# Patient Record
Sex: FEMALE | Race: WHITE | Employment: UNEMPLOYED | URBAN - METROPOLITAN AREA
[De-identification: names, ages, dates, MRNs, and addresses within clinical notes are randomized per-mention and may not be internally consistent; named-entity substitution may affect disease eponyms.]

---

## 2017-05-27 ENCOUNTER — TRANSFERRED RECORDS (OUTPATIENT)
Dept: HEALTH INFORMATION MANAGEMENT | Facility: CLINIC | Age: 22
End: 2017-05-27

## 2017-06-16 ENCOUNTER — TELEPHONE (OUTPATIENT)
Dept: OBGYN | Facility: CLINIC | Age: 22
End: 2017-06-16

## 2017-06-16 NOTE — TELEPHONE ENCOUNTER
Patient is needing her Implanon to be removed and wants another to be replaced. Please call to schedule. Thank you.

## 2017-06-19 ENCOUNTER — OFFICE VISIT (OUTPATIENT)
Dept: FAMILY MEDICINE | Facility: CLINIC | Age: 22
End: 2017-06-19
Payer: COMMERCIAL

## 2017-06-19 VITALS
HEART RATE: 85 BPM | WEIGHT: 211 LBS | HEIGHT: 64 IN | BODY MASS INDEX: 36.02 KG/M2 | TEMPERATURE: 98.5 F | DIASTOLIC BLOOD PRESSURE: 69 MMHG | SYSTOLIC BLOOD PRESSURE: 129 MMHG

## 2017-06-19 DIAGNOSIS — R10.84 ABDOMINAL PAIN, GENERALIZED: ICD-10-CM

## 2017-06-19 DIAGNOSIS — R19.7 DIARRHEA, UNSPECIFIED TYPE: ICD-10-CM

## 2017-06-19 DIAGNOSIS — N91.2 ABSENCE OF MENSTRUATION: Primary | ICD-10-CM

## 2017-06-19 LAB
ALBUMIN SERPL-MCNC: 4.1 G/DL (ref 3.4–5)
ALBUMIN UR-MCNC: NEGATIVE MG/DL
ALP SERPL-CCNC: 74 U/L (ref 40–150)
ALT SERPL W P-5'-P-CCNC: 29 U/L (ref 0–50)
ANION GAP SERPL CALCULATED.3IONS-SCNC: 7 MMOL/L (ref 3–14)
APPEARANCE UR: CLEAR
AST SERPL W P-5'-P-CCNC: 14 U/L (ref 0–45)
BASOPHILS # BLD AUTO: 0 10E9/L (ref 0–0.2)
BASOPHILS NFR BLD AUTO: 0.2 %
BETA HCG QUAL IFA URINE: NEGATIVE
BILIRUB SERPL-MCNC: 0.2 MG/DL (ref 0.2–1.3)
BILIRUB UR QL STRIP: NEGATIVE
BUN SERPL-MCNC: 11 MG/DL (ref 7–30)
CALCIUM SERPL-MCNC: 9.4 MG/DL (ref 8.5–10.1)
CHLORIDE SERPL-SCNC: 106 MMOL/L (ref 94–109)
CO2 SERPL-SCNC: 28 MMOL/L (ref 20–32)
COLOR UR AUTO: YELLOW
CREAT SERPL-MCNC: 0.88 MG/DL (ref 0.52–1.04)
DIFFERENTIAL METHOD BLD: NORMAL
EOSINOPHIL # BLD AUTO: 0.1 10E9/L (ref 0–0.7)
EOSINOPHIL NFR BLD AUTO: 1.5 %
ERYTHROCYTE [DISTWIDTH] IN BLOOD BY AUTOMATED COUNT: 12.7 % (ref 10–15)
GFR SERPL CREATININE-BSD FRML MDRD: 82 ML/MIN/1.7M2
GLUCOSE SERPL-MCNC: 74 MG/DL (ref 70–99)
GLUCOSE UR STRIP-MCNC: NEGATIVE MG/DL
HCT VFR BLD AUTO: 39.5 % (ref 35–47)
HGB BLD-MCNC: 13.5 G/DL (ref 11.7–15.7)
HGB UR QL STRIP: NEGATIVE
KETONES UR STRIP-MCNC: NEGATIVE MG/DL
LEUKOCYTE ESTERASE UR QL STRIP: NEGATIVE
LYMPHOCYTES # BLD AUTO: 2.4 10E9/L (ref 0.8–5.3)
LYMPHOCYTES NFR BLD AUTO: 41.6 %
MCH RBC QN AUTO: 31 PG (ref 26.5–33)
MCHC RBC AUTO-ENTMCNC: 34.2 G/DL (ref 31.5–36.5)
MCV RBC AUTO: 91 FL (ref 78–100)
MONOCYTES # BLD AUTO: 0.5 10E9/L (ref 0–1.3)
MONOCYTES NFR BLD AUTO: 8.1 %
NEUTROPHILS # BLD AUTO: 2.8 10E9/L (ref 1.6–8.3)
NEUTROPHILS NFR BLD AUTO: 48.6 %
NITRATE UR QL: NEGATIVE
PH UR STRIP: 6 PH (ref 5–7)
PLATELET # BLD AUTO: 171 10E9/L (ref 150–450)
POTASSIUM SERPL-SCNC: 3.9 MMOL/L (ref 3.4–5.3)
PROT SERPL-MCNC: 7.4 G/DL (ref 6.8–8.8)
RBC # BLD AUTO: 4.35 10E12/L (ref 3.8–5.2)
SODIUM SERPL-SCNC: 141 MMOL/L (ref 133–144)
SP GR UR STRIP: 1.02 (ref 1–1.03)
URN SPEC COLLECT METH UR: NORMAL
UROBILINOGEN UR STRIP-ACNC: 0.2 EU/DL (ref 0.2–1)
WBC # BLD AUTO: 5.8 10E9/L (ref 4–11)

## 2017-06-19 PROCEDURE — 85025 COMPLETE CBC W/AUTO DIFF WBC: CPT | Performed by: FAMILY MEDICINE

## 2017-06-19 PROCEDURE — 80053 COMPREHEN METABOLIC PANEL: CPT | Performed by: FAMILY MEDICINE

## 2017-06-19 PROCEDURE — 99214 OFFICE O/P EST MOD 30 MIN: CPT | Performed by: FAMILY MEDICINE

## 2017-06-19 PROCEDURE — 36415 COLL VENOUS BLD VENIPUNCTURE: CPT | Performed by: FAMILY MEDICINE

## 2017-06-19 PROCEDURE — 84703 CHORIONIC GONADOTROPIN ASSAY: CPT | Performed by: FAMILY MEDICINE

## 2017-06-19 PROCEDURE — 81003 URINALYSIS AUTO W/O SCOPE: CPT | Performed by: FAMILY MEDICINE

## 2017-06-19 NOTE — NURSING NOTE
"Chief Complaint   Patient presents with     Vomiting     would like pregnancy test       Initial /69  Pulse 85  Temp 98.5  F (36.9  C) (Oral)  Ht 5' 4\" (1.626 m)  Wt 211 lb (95.7 kg)  BMI 36.22 kg/m2 Estimated body mass index is 36.22 kg/(m^2) as calculated from the following:    Height as of this encounter: 5' 4\" (1.626 m).    Weight as of this encounter: 211 lb (95.7 kg).  Medication Reconciliation: complete     Madelyn Carter MA      "

## 2017-06-19 NOTE — MR AVS SNAPSHOT
After Visit Summary   6/19/2017    Clare Hummel    MRN: [id number]           Patient Information     Date Of Birth          1995        Visit Information        Provider Department      6/19/2017 2:00 PM Janie Bangura MD [facility]        Today's Diagnoses     Absence of menstruation    -  1    Diarrhea, unspecified type        Abdominal pain, generalized           Follow-ups after your visit        Your next 10 appointments already scheduled     Jul 11, 2017  6:20 PM CDT   PROCEDURE with Kevin Brewster MD   [facility] ([facility]    [address] [facility] [address] [phone]              Future tests that were ordered for you today     Open Future Orders        Priority Expected Expires Ordered    Enteric Bacteria and Virus Panel by JOHAN Stool Routine  6/19/2018 6/19/2017    Giardia antigen Routine  6/19/2018 6/19/2017    Clostridium difficile Toxin B PCR Routine  6/19/2018 6/19/2017            Who to contact     If you have questions or need follow up information about today's clinic visit or your schedule please contact [facility] directly at [phone].  Normal or non-critical lab and imaging results will be communicated to you by MyChart, letter or phone within 4 business days after the clinic has received the results. If you do not hear from us within 7 days, please contact the clinic through Zayohart or phone. If you have a critical or abnormal lab result, we will notify you by phone as soon as possible.  Submit refill requests through [employer] or call your pharmacy and they will forward the refill request to us. Please allow 3 business days for your refill to be completed.          Additional Information About Your Visit        MyChart Information     [employer] lets you send messages to your doctor, view your test results, renew your prescriptions, schedule appointments and more. To sign up, go to  [email or web address] . Click on \"Log in\" on the left side of the screen, which will take you to the Welcome page. Then click on \"Sign up Now\" on the right side of the page.     You will be asked to enter the access code listed below, as well as some personal information. Please follow the directions to create your username and password.     Your access code is: [email or web address]  Expires: 2017  5:26 PM     Your access code will  in 90 days. If you need help or a new code, please call your [place] clinic or [phone].        Care EveryWhere ID     This is your Care EveryWhere ID. This could be used by other organizations to access your [place] medical records  [id number]        Your Vitals Were     Pulse Temperature Height BMI (Body Mass Index)          85 98.5  F (36.9  C) (Oral) 5' 4\" (1.626 m) 36.22 kg/m2         Blood Pressure from Last 3 Encounters:   17 129/69   16 119/75   16 120/74    Weight from Last 3 Encounters:   17 211 lb (95.7 kg)   16 212 lb (96.2 kg) (98 %)*   16 214 lb 14.4 oz (97.5 kg) (98 %)*     * Growth percentiles are based on CDC 2-20 Years data.              We Performed the Following     *UA reflex to Microscopic and Culture ([place] and [facility] (except [place] and [place])     Beta HCG qual IFA urine     CBC with platelets and differential     Comprehensive metabolic panel        Primary Care Provider Office Phone # Fax #    Laina Stratton [phone]       [facility]  [address] [facility] [address]        Thank you!     Thank you for choosing [facility]  [facility]. Our goal is always to provide you with excellent care. Hearing back from our patients is one way we can continue to improve our services. Please take a few minutes to complete the written survey that you may receive in the mail after your visit with us. Thank you!             Your Updated Medication List - " Protect others around you: Learn how to safely use, store and throw away your medicines at www.disposemymeds.org.          This list is accurate as of: 6/19/17  9:04 PM.  Always use your most recent med list.                   Brand Name Dispense Instructions for use    etonogestrel 68 MG Impl    IMPLANON/NEXPLANON     1 each by Subdermal route once

## 2017-06-19 NOTE — PROGRESS NOTES
"  SUBJECTIVE:                                                    Clare Hummel is a 21 year old female who presents to clinic today for the following health issues:        Vomited today at work. Not feeling well.   Birthcontrol implant is  by 4 months.   Had slight spotting a few days ago. Diarrhea and body aches    Would like pregnancy test.     Pt with 10 days of diarrhea and then vomiting today. No fever. Stools are not black or bloody  Pt gets crampy with diarrhea.  Stooling about 3 times per day which is her norm but they are a lot more loose than normal.   No weight loss \nobody else with similar symptoms. No recent travel,unusual food.         Problem list and histories reviewed & adjusted, as indicated.  Additional history: as documented    Labs reviewed in EPIC    Reviewed and updated as needed this visit by clinical staff  Tobacco  Allergies  Meds  Med Hx  Surg Hx  Fam Hx  Soc Hx      Reviewed and updated as needed this visit by Provider         ROS:  Constitutional, HEENT, cardiovascular, pulmonary, gi and gu systems are negative, except as otherwise noted.    OBJECTIVE:                                                    /69  Pulse 85  Temp 98.5  F (36.9  C) (Oral)  Ht 5' 4\" (1.626 m)  Wt 211 lb (95.7 kg)  BMI 36.22 kg/m2  Body mass index is 36.22 kg/(m^2).  GENERAL: healthy, alert and no distress  RESP: lungs clear to auscultation - no rales, rhonchi or wheezes  CV: regular rate and rhythm, normal S1 S2, no S3 or S4, no murmur, click or rub, no peripheral edema and peripheral pulses strong  ABDOMEN: soft, diffusely mildly tender, no hepatosplenomegaly, no masses and bowel sounds normal    Diagnostic Test Results:  UPTnegative  Lab Results   Component Value Date    WBC 5.8 2017     Lab Results   Component Value Date    RBC 4.35 2017     Lab Results   Component Value Date    HGB 13.5 2017     Lab Results   Component Value Date    HCT 39.5 2017     No " components found for: MCT  Lab Results   Component Value Date    MCV 91 06/19/2017     Lab Results   Component Value Date    MCH 31.0 06/19/2017     Lab Results   Component Value Date    MCHC 34.2 06/19/2017     Lab Results   Component Value Date    RDW 12.7 06/19/2017     Lab Results   Component Value Date     06/19/2017          ASSESSMENT/PLAN:                                                            1. Absence of menstruation  Negative pregnancy test. Have her set up for an appointment with Dr caceres for Nexplanon replacement  - Beta HCG qual IFA urine    2. Diarrhea, unspecified type  Await stool cultures  - Enteric Bacteria and Virus Panel by JOHAN Stool; Future  - Giardia antigen; Future  - Clostridium difficile Toxin B PCR; Future    3. Abdominal pain, generalized  Await results  - *UA reflex to Microscopic and Culture ([place] and [facility] (except [place] and [place])  - CBC with platelets and differential  - Comprehensive metabolic panel  - Enteric Bacteria and Virus Panel by JOHAN Stool; Future        Janie Martinez MD  [facility]

## 2017-06-19 NOTE — LETTER
[facility]  [address] [facility] [address] [phone]        June 20, 2017    Clare Hummel  [address] [facility] [address]          Dear Clare,    Your electrolytes, liver and kidney function are normal. Your urine shows no signs of infection   Your hemoglobin and WBC are normal.   Please submit your stool samples if you have not already done so.     Janie Martinez MD/judy      Results for orders placed or performed in visit on 06/19/17   Beta HCG qual IFA urine   Result Value Ref Range    Beta HCG Qual IFA Urine Negative NEG   *UA reflex to Microscopic and Culture ([place] and [facility] (except [place] and [place])   Result Value Ref Range    Color Urine Yellow     Appearance Urine Clear     Glucose Urine Negative NEG mg/dL    Bilirubin Urine Negative NEG    Ketones Urine Negative NEG mg/dL    Specific Gravity Urine 1.025 1.003 - 1.035    Blood Urine Negative NEG    pH Urine 6.0 5.0 - 7.0 pH    Protein Albumin Urine Negative NEG mg/dL    Urobilinogen Urine 0.2 0.2 - 1.0 EU/dL    Nitrite Urine Negative NEG    Leukocyte Esterase Urine Negative NEG    Source Midstream Urine    CBC with platelets and differential   Result Value Ref Range    WBC 5.8 4.0 - 11.0 10e9/L    RBC Count 4.35 3.8 - 5.2 10e12/L    Hemoglobin 13.5 11.7 - 15.7 g/dL    Hematocrit 39.5 35.0 - 47.0 %    MCV 91 78 - 100 fl    MCH 31.0 26.5 - 33.0 pg    MCHC 34.2 31.5 - 36.5 g/dL    RDW 12.7 10.0 - 15.0 %    Platelet Count 171 150 - 450 10e9/L    Diff Method Automated Method     % Neutrophils 48.6 %    % Lymphocytes 41.6 %    % Monocytes 8.1 %    % Eosinophils 1.5 %    % Basophils 0.2 %    Absolute Neutrophil 2.8 1.6 - 8.3 10e9/L    Absolute Lymphocytes 2.4 0.8 - 5.3 10e9/L    Absolute Monocytes 0.5 0.0 - 1.3 10e9/L    Absolute Eosinophils 0.1 0.0 - 0.7 10e9/L    Absolute Basophils 0.0 0.0 - 0.2 10e9/L   Comprehensive metabolic panel   Result Value Ref Range    Sodium 141 133 - 144 mmol/L    Potassium  3.9 3.4 - 5.3 mmol/L    Chloride 106 94 - 109 mmol/L    Carbon Dioxide 28 20 - 32 mmol/L    Anion Gap 7 3 - 14 mmol/L    Glucose 74 70 - 99 mg/dL    Urea Nitrogen 11 7 - 30 mg/dL    Creatinine 0.88 0.52 - 1.04 mg/dL    GFR Estimate 82 >60 mL/min/1.7m2    GFR Estimate If Black >90   GFR Calc   >60 mL/min/1.7m2    Calcium 9.4 8.5 - 10.1 mg/dL    Bilirubin Total 0.2 0.2 - 1.3 mg/dL    Albumin 4.1 3.4 - 5.0 g/dL    Protein Total 7.4 6.8 - 8.8 g/dL    Alkaline Phosphatase 74 40 - 150 U/L    ALT 29 0 - 50 U/L    AST 14 0 - 45 U/L

## 2017-06-22 NOTE — TELEPHONE ENCOUNTER
Patient had an appointment 6/19/17 with Janie Bangura. She is set up for removal and replacement on 7/11/17 with Dr. Brewster.    Hugo Perdue [facility]

## 2017-06-22 NOTE — TELEPHONE ENCOUNTER
I tried reaching patient Just rings and states no voicemail set up yet.  Will try again later.  ROSA Colindres 6/22/2017

## 2017-07-11 ENCOUNTER — OFFICE VISIT (OUTPATIENT)
Dept: FAMILY MEDICINE | Facility: CLINIC | Age: 22
End: 2017-07-11
Payer: COMMERCIAL

## 2017-07-11 ENCOUNTER — DOCUMENTATION ONLY (OUTPATIENT)
Dept: LAB | Facility: CLINIC | Age: 22
End: 2017-07-11

## 2017-07-11 VITALS
WEIGHT: 215 LBS | HEART RATE: 94 BPM | OXYGEN SATURATION: 98 % | TEMPERATURE: 98.5 F | SYSTOLIC BLOOD PRESSURE: 135 MMHG | DIASTOLIC BLOOD PRESSURE: 85 MMHG | BODY MASS INDEX: 36.9 KG/M2

## 2017-07-11 DIAGNOSIS — Z30.09 COUNSELING FOR BIRTH CONTROL, ORAL CONTRACEPTIVES: ICD-10-CM

## 2017-07-11 DIAGNOSIS — Z30.46 NEXPLANON REMOVAL: Primary | ICD-10-CM

## 2017-07-11 LAB — BETA HCG QUAL IFA URINE: NEGATIVE

## 2017-07-11 PROCEDURE — 84703 CHORIONIC GONADOTROPIN ASSAY: CPT | Performed by: FAMILY MEDICINE

## 2017-07-11 PROCEDURE — 11976 REMOVE CONTRACEPTIVE CAPSULE: CPT | Performed by: FAMILY MEDICINE

## 2017-07-11 PROCEDURE — 99213 OFFICE O/P EST LOW 20 MIN: CPT | Mod: 25 | Performed by: FAMILY MEDICINE

## 2017-07-11 RX ORDER — NORGESTIMATE AND ETHINYL ESTRADIOL 7DAYSX3 28
1 KIT ORAL DAILY
Qty: 84 TABLET | Refills: 3 | Status: SHIPPED | OUTPATIENT
Start: 2017-07-11 | End: 2017-09-14

## 2017-07-11 ASSESSMENT — ANXIETY QUESTIONNAIRES
2. NOT BEING ABLE TO STOP OR CONTROL WORRYING: NOT AT ALL
5. BEING SO RESTLESS THAT IT IS HARD TO SIT STILL: NOT AT ALL
IF YOU CHECKED OFF ANY PROBLEMS ON THIS QUESTIONNAIRE, HOW DIFFICULT HAVE THESE PROBLEMS MADE IT FOR YOU TO DO YOUR WORK, TAKE CARE OF THINGS AT HOME, OR GET ALONG WITH OTHER PEOPLE: SOMEWHAT DIFFICULT
1. FEELING NERVOUS, ANXIOUS, OR ON EDGE: NEARLY EVERY DAY
3. WORRYING TOO MUCH ABOUT DIFFERENT THINGS: NOT AT ALL
7. FEELING AFRAID AS IF SOMETHING AWFUL MIGHT HAPPEN: NOT AT ALL
6. BECOMING EASILY ANNOYED OR IRRITABLE: NEARLY EVERY DAY
GAD7 TOTAL SCORE: 9

## 2017-07-11 ASSESSMENT — PATIENT HEALTH QUESTIONNAIRE - PHQ9: 5. POOR APPETITE OR OVEREATING: NEARLY EVERY DAY

## 2017-07-11 NOTE — MR AVS SNAPSHOT
After Visit Summary   7/11/2017    Clare Hummel    MRN: [id number]           Patient Information     Date Of Birth          1995        Visit Information        Provider Department      7/11/2017 6:20 PM Kevin Brewster MD [facility]        Today's Diagnoses     Nexplanon removal    -  1    Counseling for birth control, oral contraceptives          Care Instructions    1. We have removed your Implanon/Nexplanon. You are not protected against pregnancy.    2. You may remove the dressing in 24 hours. You may shower as usual after that.    3. There will be some bruising which will resolve with time.    4. The steri strips will start to peel off in 7-10 days. You may remove them at that time.    5. You have elected to try birth control pills. These are close to 100% if used without missing doses. Slightly increased risk in blood clot.    6. Since you can't rely on your periods, we should do a urine pregnancy test. I will contact you if positive.    7. Come back to see us for a physical in the next 3 months.            Follow-ups after your visit        Who to contact     If you have questions or need follow up information about today's clinic visit or your schedule please contact [facility] directly at [phone].  Normal or non-critical lab and imaging results will be communicated to you by [email or web address], letter or phone within 4 business days after the clinic has received the results. If you do not hear from us within 7 days, please contact the clinic through Soniq[employer] or phone. If you have a critical or abnormal lab result, we will notify you by phone as soon as possible.  Submit refill requests through [employer] or call your pharmacy and they will forward the refill request to us. Please allow 3 business days for your refill to be completed.          Additional Information About Your Visit        eRelyx Information     [employer] lets you send messages to your doctor, view  "your test results, renew your prescriptions, schedule appointments and more. To sign up, go to [email or web address] . Click on \"Log in\" on the left side of the screen, which will take you to the Welcome page. Then click on \"Sign up Now\" on the right side of the page.     You will be asked to enter the access code listed below, as well as some personal information. Please follow the directions to create your username and password.     Your access code is: [email or web address]  Expires: 2017  5:26 PM     Your access code will  in 90 days. If you need help or a new code, please call your [place] clinic or [phone].        Care EveryWhere ID     This is your Care EveryWhere ID. This could be used by other organizations to access your [place] medical records  [id number]        Your Vitals Were     Pulse Temperature Pulse Oximetry BMI (Body Mass Index)          94 98.5  F (36.9  C) (Oral) 98% 36.9 kg/m2         Blood Pressure from Last 3 Encounters:   17 135/85   17 129/69   16 119/75    Weight from Last 3 Encounters:   17 215 lb (97.5 kg)   17 211 lb (95.7 kg)   16 212 lb (96.2 kg) (98 %)*     * Growth percentiles are based on [facility] 2-20 Years data.              We Performed the Following     DEPRESSION ACTION PLAN (DAP)          Today's Medication Changes          These changes are accurate as of: 17  7:00 PM.  If you have any questions, ask your nurse or doctor.               Start taking these medicines.        Dose/Directions    norgestim-eth estrad triphasic 0.18/0.215/0.25 MG-35 MCG per tablet   Commonly known as:  TRINESSA (28)   Used for:  Counseling for birth control, oral contraceptives   Started by:  Kevin Brewster MD        Dose:  1 tablet   Take 1 tablet by mouth daily   Quantity:  84 tablet   Refills:  3         Stop taking these medicines if you haven't already. Please contact your care team if you have questions.     etonogestrel 68 MG Impl   Commonly " known as:  IMPLANON/NEXPLANON   Stopped by:  Kevin Brewster MD                Where to get your medicines      These medications were sent to [id number] - [place], [place] - [address]  [address] [facility] [facility] [place] [address]     Phone:  [phone]     norgestim-eth estrad triphasic 0.18/0.215/0.25 MG-35 MCG per tablet                Primary Care Provider Office Phone # Fax #    Laina Samantha Stratton [phone]       [facility]  [address] [facility] [address]        Equal Access to Services     [facility]: Hadii marilia monreal hadkateyo Soinocencio, waaxda luqadaha, qaybta kaalmada adekimmieyada, jassi ayala. So [facility] [phone].    ATENCIÓN: Si habla español, tiene a cruz disposición servicios gratuitos de asistencia lingüística. [facility] [phone].    We comply with applicable federal civil rights laws and [place] laws. We do not discriminate on the basis of race, color, national origin, age, disability sex, sexual orientation or gender identity.            Thank you!     Thank you for choosing [facility]  [facility]. Our goal is always to provide you with excellent care. Hearing back from our patients is one way we can continue to improve our services. Please take a few minutes to complete the written survey that you may receive in the mail after your visit with us. Thank you!             Your Updated Medication List - Protect others around you: Learn how to safely use, store and throw away your medicines at www.disposemymeds.org.          This list is accurate as of: 7/11/17  7:00 PM.  Always use your most recent med list.                   Brand Name Dispense Instructions for use Diagnosis    norgestim-eth estrad triphasic 0.18/0.215/0.25 MG-35 MCG per tablet    TRINESSA (28)    84 tablet    Take 1 tablet by mouth daily    Counseling for birth control, oral contraceptives

## 2017-07-11 NOTE — NURSING NOTE
"Chief Complaint   Patient presents with     Minor Procedure     Nexplanon removal        Initial /85 (Cuff Size: Adult Regular)  Pulse 94  Temp 98.5  F (36.9  C) (Oral)  Wt 215 lb (97.5 kg)  SpO2 98%  BMI 36.9 kg/m2 Estimated body mass index is 36.9 kg/(m^2) as calculated from the following:    Height as of 6/19/17: 5' 4\" (1.626 m).    Weight as of this encounter: 215 lb (97.5 kg).  Medication Reconciliation: complete    Darcy De Jesus LPN    "

## 2017-07-11 NOTE — PATIENT INSTRUCTIONS
1. We have removed your Implanon/Nexplanon. You are not protected against pregnancy.    2. You may remove the dressing in 24 hours. You may shower as usual after that.    3. There will be some bruising which will resolve with time.    4. The steri strips will start to peel off in 7-10 days. You may remove them at that time.    5. You have elected to try birth control pills. These are close to 100% if used without missing doses. Slightly increased risk in blood clot.    6. Since you can't rely on your periods, we should do a urine pregnancy test. I will contact you if positive.    7. Come back to see us for a physical in the next 3 months.

## 2017-07-11 NOTE — LETTER
My Depression Action Plan  Name: Clare Hummel   Date of Birth 1995  Date: 7/11/2017    My doctor: Laina Stratton   My clinic: [facility]  [address] [place] [address] [phone]          GREEN    ZONE   Good Control    What it looks like:     Things are going generally well. You have normal up s and down s. You may even feel depressed from time to time, but bad moods usually last less than a day.   What you need to do:  1. Continue to care for yourself (see self care plan)  2. Check your depression survival kit and update it as needed  3. Follow your physician s recommendations including any medication.  4. Do not stop taking medication unless you consult with your physician first.           YELLOW         ZONE Getting Worse    What it looks like:     Depression is starting to interfere with your life.     It may be hard to get out of bed; you may be starting to isolate yourself from others.    Symptoms of depression are starting to last most all day and this has happened for several days.     You may have suicidal thoughts but they are not constant.   What you need to do:     1. Call your care team, your response to treatment will improve if you keep your care team informed of your progress. Yellow periods are signs an adjustment may need to be made.     2. Continue your self-care, even if you have to fake it!    3. Talk to someone in your support network    4. Open up your depression survival kit           RED    ZONE Medical Alert - Get Help    What it looks like:     Depression is seriously interfering with your life.     You may experience these or other symptoms: You can t get out of bed most days, can t work or engage in other necessary activities, you have trouble taking care of basic hygiene, or basic responsibilities, thoughts of suicide or death that will not go away, self-injurious behavior.     What you need to do:  1. Call your care team and  request a same-day appointment. If they are not available (weekends or after hours) call your local crisis line, emergency room or 911.          Depression Self Care Plan / Survival Kit    Self-Care for Depression  Here s the deal. Your body and mind are really not as separate as most people think.  What you do and think affects how you feel and how you feel influences what you do and think. This means if you do things that people who feel good do, it will help you feel better.  Sometimes this is all it takes.  There is also a place for medication and therapy depending on how severe your depression is, so be sure to consult with your medical provider and/ or Behavioral Health Consultant if your symptoms are worsening or not improving.     In order to better manage my stress, I will:    Exercise  Get some form of exercise, every day. This will help reduce pain and release endorphins, the  feel good  chemicals in your brain. This is almost as good as taking antidepressants!  This is not the same as joining a gym and then never going! (they count on that by the way ) It can be as simple as just going for a walk or doing some gardening, anything that will get you moving.      Hygiene   Maintain good hygiene (Get out of bed in the morning, Make your bed, Brush your teeth, Take a shower, and Get dressed like you were going to work, even if you are unemployed).  If your clothes don't fit try to get ones that do.    Diet  I will strive to eat foods that are good for me, drink plenty of water, and avoid excessive sugar, caffeine, alcohol, and other mood-altering substances.  Some foods that are helpful in depression are: complex carbohydrates, B vitamins, flaxseed, fish or fish oil, fresh fruits and vegetables.    Psychotherapy  I agree to participate in Individual Therapy (if recommended).    Medication  If prescribed medications, I agree to take them.  Missing doses can result in serious side effects.  I understand that  drinking alcohol, or other illicit drug use, may cause potential side effects.  I will not stop my medication abruptly without first discussing it with my provider.    Staying Connected With Others  I will stay in touch with my friends, family members, and my primary care provider/team.    Use your imagination  Be creative.  We all have a creative side; it doesn t matter if it s oil painting, sand castles, or mud pies! This will also kick up the endorphins.    Witness Beauty  (AKA stop and smell the roses) Take a look outside, even in mid-winter. Notice colors, textures. Watch the squirrels and birds.     Service to others  Be of service to others.  There is always someone else in need.  By helping others we can  get out of ourselves  and remember the really important things.  This also provides opportunities for practicing all the other parts of the program.    Humor  Laugh and be silly!  Adjust your TV habits for less news and crime-drama and more comedy.    Control your stress  Try breathing deep, massage therapy, biofeedback, and meditation. Find time to relax each day.     My support system    Clinic Contact:  Phone number:    Contact 1:  Phone number:    Contact 2:  Phone number:    Religion/:  Phone number:    Therapist:  Phone number:    Local crisis center:    Phone number:    Other community support:  Phone number:

## 2017-07-12 ASSESSMENT — PATIENT HEALTH QUESTIONNAIRE - PHQ9: SUM OF ALL RESPONSES TO PHQ QUESTIONS 1-9: 3

## 2017-07-12 ASSESSMENT — ANXIETY QUESTIONNAIRES: GAD7 TOTAL SCORE: 9

## 2017-07-12 NOTE — PROGRESS NOTES
"Lidocaine 2% with epinephrine-Lot # [id number], Exp 04/2019, NDC# 05890-380-24    HPI:    I am seeing Clare Hummel for the 1st time. She is a 21 year old female here to discuss:    Contraception -     Date Nexplanon placed: 4/15/14  Location: Left arm  Reason for removal: has been over 3 years  LMP: has not had a period in a long time.  Plan for today:   We removed Nexplanon today   She no longer wants Nexplanon due to \"I'm scared\" despite my efforts to reassure her.   She would like OCP - I prescribed Ortho tri cyclen after discussing side effects.   We will check UPT and contact her if it is positive.    Preventive - advised to come in for a physical.    Exam:    /85 (Cuff Size: Adult Regular)  Pulse 94  Temp 98.5  F (36.9  C) (Oral)  Wt 215 lb (97.5 kg)  SpO2 98%  BMI 36.9 kg/m2    Gen: Healthy appearing female in no acute distress. Here with a friend.  Neck: No enlarged lymph nodes, thyromegally or other masses.  Lungs: Good air movement and otherwise clear.  CV: Heart RRR with no murmurs.  Skin: The Nexplanon is palpable subcutaneously on the left arm.       Assessment    1. Contraception/Nexplanon removal      Procedure -     Small risk of bleeding, infection, poor cosmetic outcome discussed. She gave verbal consent.     I had her lie supine on the exam table. The left arm area is examined and the Nexplanon is easily palpated under the skin. The area was cleaned with betadine and alcohol. The procedure was done using aseptic technique. Using 30 gauge needle, about 1 cc 2% lidocaine with epinephrine was applied for local anesthesia. This was placed around the distal end of the implant but not right on it to avoid distorting the anatomy. The proximal end of the implant was pushed down, causing the distal end to tent up. Then 11 blade was used to nick the skin over the distal end and carefully access the end of the implant. Then I pulled out the implant whole. Minimal bleeding is controlled with " local pressure. The wound is about 3 mm in size and steri strips are used to cover it. Pressure dressing is applied and instructions are given.      See HPI for OCP.              Plan  Orders Placed This Encounter     REMOVAL IMPLATABLE CONTRACEPTIVE CAPSULE

## 2017-09-13 NOTE — PROGRESS NOTES
SUBJECTIVE:                                                    Clare Hummel is a 21 year old female who presents to clinic today for the following health issues:    SINUS SYMPTOMS      Duration: 3 days    Description  Cough, nasal congestion, facial pain/pressure, left ear pain, sore throat - pnd.     Severity: moderate    Accompanying signs and symptoms: vomited last night and diarrhea started today     History (predisposing factors):  asthma and tobacco abuse    Precipitating or alleviating factors: None    Therapies tried and outcome:  Nyquil, dayquil, netti pot and ibuprofen - little relief     Problem list and histories reviewed & adjusted, as indicated.  Additional history: as documented    Patient Active Problem List   Diagnosis     ADD (attention deficit disorder)     Generalized anxiety disorder     Major depressive disorder, single episode, moderate (H)     History reviewed. No pertinent surgical history.    Social History   Substance Use Topics     Smoking status: Current Every Day Smoker     Packs/day: 1.00     Types: Cigarettes     Smokeless tobacco: Never Used      Comment: mother and mother's boyfriend smoke outside     Alcohol use Yes     Family History   Problem Relation Age of Onset     CEREBROVASCULAR DISEASE Maternal Grandmother      Dementia Maternal Grandmother      DIABETES No family hx of      Breast Cancer No family hx of      Other Cancer No family hx of      Substance Abuse No family hx of      OSTEOPOROSIS No family hx of      Hypertension No family hx of          Current Outpatient Prescriptions   Medication Sig Dispense Refill     fluticasone (FLONASE) 50 MCG/ACT spray Spray 1-2 sprays into both nostrils daily 1 g 11     Allergies   Allergen Reactions     Benadryl Allergy Itching     Penicillins      rash     Sulfa Drugs      rash         ROS:  Constitutional, HEENT, cardiovascular, pulmonary, gi and gu systems are negative, except as otherwise noted.      OBJECTIVE:   /87   "Pulse 122  Temp 98.5  F (36.9  C) (Oral)  Ht 5' 4\" (1.626 m)  Wt 221 lb (100.2 kg)  SpO2 98%  BMI 37.93 kg/m2  Body mass index is 37.93 kg/(m^2).  GENERAL: healthy, alert and no distress  Head: Normocephalic, atraumatic.  Eyes: Conjunctiva clear, non icteric. PERRLA.  Ears: External ears and TMs normal BL.  Nasal congestion with posterior nasal drainage.  maxillary tenderness.   Mouth / Throat: Normal dentition.  No oral lesions. Pharynx non erythematous, tonsils without hypertrophy.  Neck: Supple, no enlarged LN, trachea midline.   RESP: lungs clear to auscultation - no rales, rhonchi or wheezes  CV: regular rate and rhythm, normal S1 S2, no S3 or S4, no murmur, click or rub, no peripheral edema      Diagnostic Test Results:  none     ASSESSMENT/PLAN:         ICD-10-CM    1. Sinusitis, unspecified chronicity, unspecified location J32.9 fluticasone (FLONASE) 50 MCG/ACT spray   Warning signs discussed.  side effects discussed  Symptomatic treatment: such as fluids,  OTC acetaminophen and /or non-steroidal anti-inflammatory medication.  Follow up  1-2 wks as needed - may need antibiotics if not improving.     Yimi Sandoval PA-C  [facility]  "

## 2017-09-14 ENCOUNTER — OFFICE VISIT (OUTPATIENT)
Dept: FAMILY MEDICINE | Facility: CLINIC | Age: 22
End: 2017-09-14
Payer: COMMERCIAL

## 2017-09-14 VITALS
WEIGHT: 221 LBS | OXYGEN SATURATION: 98 % | DIASTOLIC BLOOD PRESSURE: 87 MMHG | BODY MASS INDEX: 37.73 KG/M2 | HEART RATE: 122 BPM | HEIGHT: 64 IN | SYSTOLIC BLOOD PRESSURE: 139 MMHG | TEMPERATURE: 98.5 F

## 2017-09-14 DIAGNOSIS — J32.9 SINUSITIS, UNSPECIFIED CHRONICITY, UNSPECIFIED LOCATION: Primary | ICD-10-CM

## 2017-09-14 PROCEDURE — 99213 OFFICE O/P EST LOW 20 MIN: CPT | Performed by: PHYSICIAN ASSISTANT

## 2017-09-14 RX ORDER — FLUTICASONE PROPIONATE 50 MCG
1-2 SPRAY, SUSPENSION (ML) NASAL DAILY
Qty: 1 G | Refills: 11 | Status: SHIPPED | OUTPATIENT
Start: 2017-09-14 | End: 2018-03-03

## 2017-09-14 NOTE — NURSING NOTE
"Chief Complaint   Patient presents with     Sinus Problem       Initial /87  Pulse 122  Temp 98.5  F (36.9  C) (Oral)  Ht 5' 4\" (1.626 m)  Wt 221 lb (100.2 kg)  SpO2 98%  BMI 37.93 kg/m2 Estimated body mass index is 37.93 kg/(m^2) as calculated from the following:    Height as of this encounter: 5' 4\" (1.626 m).    Weight as of this encounter: 221 lb (100.2 kg).  Medication Reconciliation: complete  Linda Cardona CMA    "

## 2017-09-14 NOTE — MR AVS SNAPSHOT
"              After Visit Summary   2017    Clare Hummel    MRN: [id number]           Patient Information     Date Of Birth          1995        Visit Information        Provider Department      2017 11:20 AM Yimi Sandoval PA-C [facility]        Today's Diagnoses     Sinusitis, unspecified chronicity, unspecified location    -  1       Follow-ups after your visit        Who to contact     If you have questions or need follow up information about today's clinic visit or your schedule please contact [facility] directly at [phone].  Normal or non-critical lab and imaging results will be communicated to you by [employer], letter or phone within 4 business days after the clinic has received the results. If you do not hear from us within 7 days, please contact the clinic through [employer] or phone. If you have a critical or abnormal lab result, we will notify you by phone as soon as possible.  Submit refill requests through [employer] or call your pharmacy and they will forward the refill request to us. Please allow 3 business days for your refill to be completed.          Additional Information About Your Visit        MyChart Information     [employer] lets you send messages to your doctor, view your test results, renew your prescriptions, schedule appointments and more. To sign up, go to [email or web address] . Click on \"Log in\" on the left side of the screen, which will take you to the Welcome page. Then click on \"Sign up Now\" on the right side of the page.     You will be asked to enter the access code listed below, as well as some personal information. Please follow the directions to create your username and password.     Your access code is: [email or web address]  Expires: 2017 11:48 AM     Your access code will  in 90 days. If you need help or a new code, please call your [facility] or [phone].        Care EveryWhere ID     This is your Care " "EveryWhere ID. This could be used by other organizations to access your [place] medical records  [id number]        Your Vitals Were     Pulse Temperature Height Pulse Oximetry BMI (Body Mass Index)       122 98.5  F (36.9  C) (Oral) 5' 4\" (1.626 m) 98% 37.93 kg/m2        Blood Pressure from Last 3 Encounters:   09/14/17 139/87   07/11/17 135/85   06/19/17 129/69    Weight from Last 3 Encounters:   09/14/17 221 lb (100.2 kg)   07/11/17 215 lb (97.5 kg)   06/19/17 211 lb (95.7 kg)              Today, you had the following     No orders found for display         Today's Medication Changes          These changes are accurate as of: 9/14/17 11:48 AM.  If you have any questions, ask your nurse or doctor.               Start taking these medicines.        Dose/Directions    fluticasone 50 MCG/ACT spray   Commonly known as:  FLONASE   Used for:  Sinusitis, unspecified chronicity, unspecified location   Started by:  Yimi Sandoval PA-C        Dose:  1-2 spray   Spray 1-2 sprays into both nostrils daily   Quantity:  1 g   Refills:  11            Where to get your medicines      These medications were sent to [facility] [address], [address]  [address] [place] [address]     Phone:  [phone]     fluticasone 50 MCG/ACT spray                Primary Care Provider Office Phone # Fax #    Kevin Brewster [phone]       [address] [place], [place] [address]        Equal Access to Services     [facility]: Hadmarimar Long, wabitada luqadaha, qaybta kaaljassi person. So [facility] [phone].    ATENCIÓN: Si habla español, tiene a cruz disposición servicios gratuitos de asistencia lingüística. Llame al [phone].    We comply with applicable federal civil rights laws and [place] laws. We do not discriminate on the basis of race, color, national origin, age, disability sex, sexual " orientation or gender identity.            Thank you!     Thank you for choosing [facility]  for your [facility]. Our goal is always to provide you with excellent care. Hearing back from our patients is one way we can continue to improve our services. Please take a few minutes to complete the written survey that you may receive in the mail after your visit with us. Thank you!             Your Updated Medication List - Protect others around you: Learn how to safely use, store and throw away your medicines at www.disposemymeds.org.          This list is accurate as of: 9/14/17 11:48 AM.  Always use your most recent med list.                   Brand Name Dispense Instructions for use Diagnosis    fluticasone 50 MCG/ACT spray    FLONASE    1 g    Spray 1-2 sprays into both nostrils daily    Sinusitis, unspecified chronicity, unspecified location

## 2017-11-08 ENCOUNTER — OFFICE VISIT (OUTPATIENT)
Dept: FAMILY MEDICINE | Facility: CLINIC | Age: 22
End: 2017-11-08
Payer: COMMERCIAL

## 2017-11-08 VITALS — TEMPERATURE: 97 F | HEART RATE: 88 BPM | OXYGEN SATURATION: 97 % | BODY MASS INDEX: 38.59 KG/M2 | WEIGHT: 224.8 LBS

## 2017-11-08 DIAGNOSIS — K52.9 GASTROENTERITIS: Primary | ICD-10-CM

## 2017-11-08 DIAGNOSIS — M54.9 CHRONIC BILATERAL BACK PAIN, UNSPECIFIED BACK LOCATION: ICD-10-CM

## 2017-11-08 DIAGNOSIS — G89.29 CHRONIC BILATERAL BACK PAIN, UNSPECIFIED BACK LOCATION: ICD-10-CM

## 2017-11-08 DIAGNOSIS — M54.2 NECK PAIN: ICD-10-CM

## 2017-11-08 DIAGNOSIS — Z11.3 SCREEN FOR STD (SEXUALLY TRANSMITTED DISEASE): ICD-10-CM

## 2017-11-08 PROCEDURE — 99214 OFFICE O/P EST MOD 30 MIN: CPT | Performed by: INTERNAL MEDICINE

## 2017-11-08 PROCEDURE — 87491 CHLMYD TRACH DNA AMP PROBE: CPT | Performed by: INTERNAL MEDICINE

## 2017-11-08 PROCEDURE — 87591 N.GONORRHOEAE DNA AMP PROB: CPT | Performed by: INTERNAL MEDICINE

## 2017-11-08 RX ORDER — ONDANSETRON 4 MG/1
4 TABLET, FILM COATED ORAL EVERY 8 HOURS PRN
Qty: 18 TABLET | Refills: 0 | Status: SHIPPED | OUTPATIENT
Start: 2017-11-08 | End: 2018-03-03

## 2017-11-08 NOTE — NURSING NOTE
"Chief Complaint   Patient presents with     Vomiting     with diarrhea stated at 1 AM       Initial Pulse 88  Temp 97  F (36.1  C) (Oral)  Wt 224 lb 12.8 oz (102 kg)  SpO2 97%  BMI 38.59 kg/m2 Estimated body mass index is 38.59 kg/(m^2) as calculated from the following:    Height as of 9/14/17: 5' 4\" (1.626 m).    Weight as of this encounter: 224 lb 12.8 oz (102 kg).  Medication Reconciliation: complete   Arabella Hernandez CMA      "

## 2017-11-08 NOTE — PROGRESS NOTES
SUBJECTIVE:  Clare Hummel is an 22 year old female who presents for not feeling well.  Last night started having watery diarrhea.  Has vomited twice.  Has had multiple watery stools.  Some abdominal cramping right before and during stooling.  Ate a salad yesterday from a gas station yesterday, tasted okay. no fevers, but sometimes hot when having diarrhea.  No cough or runny nose.  Drinking water and sprite and that stays down.  Hasn't eaten anything today and hasn't felt hungry until just a while ago.  Last BM about 4 hours ago, last vomiting about 6 hours ago.   Tried to go to work today but didn't because felt bad.  No known exposures.  No recent travel.  Took nyquil but didn't help and vomited it up.  Took ibuprofen this morning which helped some.  Also having neck and back pain from having large breasts.  Wondering what to do about the pain.  Has tried to lose weight but hasn't been successful.  Has tried diets and exercise and hasn't helped.  Also wants STD testing for gonorrhea and chlamydia.  No known exposures, and one partner over past year, but likes to get tested periodically.  Doesn't want other std testing done,       has a past medical history of ADD (attention deficit disorder) (9/22/2010).    ALLERGIES:  Benadryl allergy; Penicillins; and Sulfa drugs    Current Outpatient Prescriptions   Medication     ondansetron (ZOFRAN) 4 MG tablet     fluticasone (FLONASE) 50 MCG/ACT spray     No current facility-administered medications for this visit.          ROS:  ROS is done and is negative for general, constitutional, eye, ENT, Respiratory, cardiovascular, GI, , Skin, musculoskeletal except as noted elsewhere.      OBJECTIVE:  Pulse 88  Temp 97  F (36.1  C) (Oral)  Wt 224 lb 12.8 oz (102 kg)  SpO2 97%  BMI 38.59 kg/m2  GENERAL APPEARANCE: Alert, in no acute distress  EYES: normal  EARS: External ears normal. Canals clear. TM's normal.  NOSE:normal  OROPHARYNX:normal  NECK:No adenopathy,masses or  thyromegaly  RESP: normal and clear to auscultation  CV:regular rate and rhythm and no murmurs, clicks, or gallops  ABDOMEN: Abdomen soft.  Mild epigastric tenderness, no rebound. BS normal. No masses, organomegaly  SKIN: no ulcers, lesions or rash  MUSCULOSKELETAL: upper back and neck with moderate muscle tightness and mild diffuse tenderness. No vertebral tenderness.  NEURO: grossly intact.      RESULTS  .  No results found for this or any previous visit (from the past 48 hour(s)).    ASSESSMENT/PLAN:    ASSESSMENT / PLAN:  (K52.9) Gastroenteritis  (primary encounter diagnosis)  Comment: c/w food borne or viral etiology.  Plan: ondansetron (ZOFRAN) 4 MG tablet        Reviewed medication instructions and side effects. Follow up if experiences side effects.. I reviewed supportive care, expected course, and signs of concern.  Follow up as needed or if she does not improve within 3d or if worsens in any way.  Reviewed red flag symptoms and is to go to the ER if experiences any of these.    (M54.2) Neck pain  Comment: muscular  Plan: [facility]RACTIC REFERRAL        Refer to PT. I reviewed supportive care including nsaids and icing, expected course, and signs of concern.  Follow up as needed or if she does not improve within 3w or if worsens in any way.  Reviewed red flag symptoms and is to go to the ER if experiences any of these.    (M54.9,  G89.29) Chronic bilateral back pain, unspecified back location  Comment: muscular  Plan: [facility], [facility] CHIROPRACTIC REFERRAL        Refer to PT for eval and tx. I reviewed supportive care including nsaids and icing, expected course, and signs of concern.  Follow up as needed or if she does not improve within 3w or if worsens in any way.  Reviewed red flag symptoms and is to go to the ER if experiences any of these.    (Z11.3) Screen for STD (sexually transmitted disease)  Comment: pt requests testing for mary/chlam, but refuses testing for other stds  today.  Plan: NEISSERIA GONORRHOEA PCR, CHLAMYDIA TRACHOMATIS        PCR  Reveiwed the importance of condom use and advised her   that condoms are not fully effective for the prevention of STDs.  Advised to have regular testing for stds.       See [facility] for orders, medications, letters, patient instructions    Laina Houston M.D.

## 2017-11-08 NOTE — MR AVS SNAPSHOT
After Visit Summary   11/8/2017    Clare Hummel    MRN: [id number]           Patient Information     Date Of Birth          1995        Visit Information        Provider Department      11/8/2017 1:40 PM Laina Houston MD [facility]        Today's Diagnoses     Gastroenteritis    -  1    Neck pain        Chronic bilateral back pain, unspecified back location        Screen for STD (sexually transmitted disease)           Follow-ups after your visit        Additional Services     [facility], [facility] CH[facility]RACTIC REFERRAL       **This order will print in the [facility] Scheduling Office**    Physical Therapy, Hand Therapy and Chiropractic Care are available through:    *[facility]  *[facility]  *[facility]    Call one number to schedule at any of the above locations: [phone].    Your provider has referred you to: Physical Therapy at [facility] or [facility]    Indication/Reason for Referral: back and neck pain  Onset of Illness: months  Therapy Orders: Evaluate and Treat  Special Programs: as needed  Special Request: as needed    Jared Coleman      Additional Comments for the Therapist or Chiropractor:     Please be aware that coverage of these services is subject to the terms and limitations of your health insurance plan.  Call member services at your health plan with any benefit or coverage questions.      Please bring the following to your appointment:    *Your personal calendar for scheduling future appointments  *Comfortable clothing                  Follow-up notes from your care team     Return in about 3 days (around 11/11/2017), or if symptoms worsen or fail to improve.      Your next 10 appointments already scheduled     Nov 16, 2017 12:10 PM CST   SHORT with Ana Regan MD   [facility] ([facility])    [address] [facility] [address] [phone]              Who to  "contact     If you have questions or need follow up information about today's clinic visit or your schedule please contact [facility] directly at [phone].  Normal or non-critical lab and imaging results will be communicated to you by MyChart, letter or phone within 4 business days after the clinic has received the results. If you do not hear from us within 7 days, please contact the clinic through MyChart or phone. If you have a critical or abnormal lab result, we will notify you by phone as soon as possible.  Submit refill requests through [employer] or call your pharmacy and they will forward the refill request to us. Please allow 3 business days for your refill to be completed.          Additional Information About Your Visit        [employer] [employer] Information     [employer] lets you send messages to your doctor, view your test results, renew your prescriptions, schedule appointments and more. To sign up, go to [email or web address] . Click on \"Log in\" on the left side of the screen, which will take you to the Welcome page. Then click on \"Sign up Now\" on the right side of the page.     You will be asked to enter the access code listed below, as well as some personal information. Please follow the directions to create your username and password.     Your access code is: [email or web address]  Expires: 2017 10:48 AM     Your access code will  in 90 days. If you need help or a new code, please call your [place] clinic or [phone].        Care EveryWhere ID     This is your Care EveryWhere ID. This could be used by other organizations to access your [place] medical records  [id number]        Your Vitals Were     Pulse Temperature Pulse Oximetry BMI (Body Mass Index)          88 97  F (36.1  C) (Oral) 97% 38.59 kg/m2         Blood Pressure from Last 3 Encounters:   17 139/87   17 135/85   17 129/69    Weight from Last 3 Encounters:   17 224 lb 12.8 oz (102 kg)   17 221 " lb (100.2 kg)   07/11/17 215 lb (97.5 kg)              We Performed the Following     CHLAMYDIA TRACHOMATIS PCR     [facility] HAND, AND CHIROPRACTIC REFERRAL     NEISSERIA GONORRHOEA PCR          Today's Medication Changes          These changes are accurate as of: 11/8/17  2:55 PM.  If you have any questions, ask your nurse or doctor.               Start taking these medicines.        Dose/Directions    ondansetron 4 MG tablet   Commonly known as:  ZOFRAN   Used for:  Gastroenteritis   Started by:  Laina Houston MD        Dose:  4 mg   Take 1 tablet (4 mg) by mouth every 8 hours as needed for nausea   Quantity:  18 tablet   Refills:  0            Where to get your medicines      These medications were sent to [facility] [facility] [address] [email or web address] [facility]  [address] [facility] [address]     Phone:  [phone]     ondansetron 4 MG tablet                Primary Care Provider Office Phone # Fax #    Kevin Brewster [phone] [address] [facility] [address]        Equal Access to Services     [facility]: Hadii aad ku hadasho Soomaali, waaxda luqadaha, qaybta kaalmada adeegyada, jassi berkowitz . So [facility] [phone].    ATENCIÓN: Si habla español, tiene a cruz disposición servicios gratuitos de asistencia lingüística. [facility] [phone].    We comply with applicable federal civil rights laws and [place] laws. We do not discriminate on the basis of race, color, national origin, age, disability, sex, sexual orientation, or gender identity.            Thank you!     Thank you for choosing [facility]  for your care. Our goal is always to provide you with excellent care. Hearing back from our patients is one way we can continue to improve our services. Please take a few minutes to complete the written survey that you may receive in the mail after your visit with us. Thank you!             Your Updated Medication  List - Protect others around you: Learn how to safely use, store and throw away your medicines at www.disposemymeds.org.          This list is accurate as of: 11/8/17  2:55 PM.  Always use your most recent med list.                   Brand Name Dispense Instructions for use Diagnosis    fluticasone 50 MCG/ACT spray    FLONASE    1 g    Spray 1-2 sprays into both nostrils daily    Sinusitis, unspecified chronicity, unspecified location       ondansetron 4 MG tablet    ZOFRAN    18 tablet    Take 1 tablet (4 mg) by mouth every 8 hours as needed for nausea    Gastroenteritis

## 2017-11-09 LAB
C TRACH DNA SPEC QL NAA+PROBE: NEGATIVE
N GONORRHOEA DNA SPEC QL NAA+PROBE: NEGATIVE
SPECIMEN SOURCE: NORMAL
SPECIMEN SOURCE: NORMAL

## 2017-11-29 ENCOUNTER — OFFICE VISIT (OUTPATIENT)
Dept: FAMILY MEDICINE | Facility: OTHER | Age: 22
End: 2017-11-29
Payer: COMMERCIAL

## 2017-11-29 VITALS
OXYGEN SATURATION: 97 % | RESPIRATION RATE: 16 BRPM | SYSTOLIC BLOOD PRESSURE: 126 MMHG | HEART RATE: 95 BPM | BODY MASS INDEX: 39.31 KG/M2 | TEMPERATURE: 99.2 F | DIASTOLIC BLOOD PRESSURE: 80 MMHG | WEIGHT: 229 LBS

## 2017-11-29 DIAGNOSIS — B00.1 HERPES LABIALIS: Primary | ICD-10-CM

## 2017-11-29 DIAGNOSIS — N92.6 MISSED PERIOD: ICD-10-CM

## 2017-11-29 LAB — BETA HCG QUAL IFA URINE: NEGATIVE

## 2017-11-29 PROCEDURE — 84703 CHORIONIC GONADOTROPIN ASSAY: CPT | Performed by: PHYSICIAN ASSISTANT

## 2017-11-29 PROCEDURE — 99213 OFFICE O/P EST LOW 20 MIN: CPT | Performed by: PHYSICIAN ASSISTANT

## 2017-11-29 RX ORDER — DOCOSANOL 100 MG/G
CREAM TOPICAL
Qty: 2 G | Refills: 0 | Status: SHIPPED | OUTPATIENT
Start: 2017-11-29 | End: 2018-03-03

## 2017-11-29 NOTE — PROGRESS NOTES
"  SUBJECTIVE:                                                    Clare Hummel is a 22 year old female who presents to clinic today for the following health issues:      HPI    Concern - Bumps on Lip  Onset: 1 day    Description:   Outside of bottom lip    Progression of Symptoms:  worsening    Accompanying Signs & Symptoms:  Spreading across lips, was only on side  Dryer than normal  Painful    Previous history of similar problem:   No    Precipitating factors:   Worsened by: licking lips    Alleviating factors:  Improved by: nothing    Therapies Tried and outcome: vasoline moisturizer for lips    The painful bump on her lip started yesterday and seemed to spread across her lower lip this morning.      Patient would also like a pregnancy test today. She states her last period ended October 27 so she is a few days late and is usually regular. She is not using birth control and her boyfriend does not wear condoms. \" We are not not trying to get pregnant\" and she states \"if it happens it happens\".     Problem list and histories reviewed & adjusted, as indicated.  Additional history: none    ROS:  GENERAL: Denies fever, fatigue, weakness, weight gain, or weight loss.  CARDIOVASCULAR: Denies chest pain, shortness of breath, irregular heartbeats,  palpitations, or edema.  RESPIRATORY: Denies cough, hemoptysis, and shortness of breath.  SKIN: +Painful bumps on lower lip.     OBJECTIVE:     /80 (BP Location: Right arm, Patient Position: Chair, Cuff Size: Adult Regular)  Pulse 95  Temp 99.2  F (37.3  C) (Temporal)  Resp 16  Wt 229 lb (103.9 kg)  SpO2 97%  BMI 39.31 kg/m2  Body mass index is 39.31 kg/(m^2).  GENERAL: healthy, alert and no distress  SKIN: Small vesicles and an open sore over the mid lower lip.     Results for orders placed or performed in visit on 11/29/17   Beta HCG qual IFA urine - [facility] and [place]   Result Value Ref Range    Beta HCG Qual IFA Urine Negative NEG^Negative    "       ASSESSMENT/PLAN:       ICD-10-CM    1. Herpes labialis B00.1 docosanol (ABREVA) 10 % CREA cream   2. Missed period N92.6 Beta HCG qual IFA urine - [facility] and [place]       Lips sores consistent with oral herpes.  I recommend Abreva 5 times daily for 10 days. If symptoms have not resolved, she will let me know.    Pregnancy test is negative. I discussed the importance of contraception, at least condoms to prevent unplanned pregnancy and STDs.     Pablo Landry PA-C  [facility]

## 2017-11-29 NOTE — NURSING NOTE
"Chief Complaint   Patient presents with     Mouth/Lip Problem       Initial /80 (BP Location: Right arm, Patient Position: Chair, Cuff Size: Adult Regular)  Pulse 95  Temp 99.2  F (37.3  C) (Temporal)  Resp 16  Wt 229 lb (103.9 kg)  SpO2 97%  BMI 39.31 kg/m2 Estimated body mass index is 39.31 kg/(m^2) as calculated from the following:    Height as of 9/14/17: 5' 4\" (1.626 m).    Weight as of this encounter: 229 lb (103.9 kg).  Medication Reconciliation: complete     Kyung Shaffer CMA      "

## 2017-11-29 NOTE — MR AVS SNAPSHOT
After Visit Summary   11/29/2017    Clare Hummel    MRN: [id number]           Patient Information     Date Of Birth          1995        Visit Information        Provider Department      11/29/2017 1:00 PM Pablo Landry PA-C [facility]        Today's Diagnoses     Herpes labialis    -  1    Missed period          Care Instructions    Will prescribe Abreva to use 5 times daily for 10 days over the lips.  Avoid kissing and sexual contact until bumps are gone.      Herpes: Caring for Sores  Good hygiene matters when you have herpes. Take care of your sores to speed healing. Neglected sores can lead to other infections.     Warm baths can ease itching and burning caused by sores.   To ease your symptoms    Take any medicines as directed.    Take acetaminophen or ibuprofen for pain.    Take warm or cool baths to relieve itching of sores. And don t share towels when you have a sore.    Urinate in a tub of warm water to prevent burning. This works for women with genital herpes.    Don t wear tight clothes or nylon underwear. They can trap moisture, cause chafing, and prevent sores from healing.  To speed healing    Wash the sores with mild soap and water. And wash your hands after you touch a sore.    Dry the affected area completely by patting a towel over it. Don't rub. Don't share towels.    Don t bandage sores. The dry air helps them heal.    Avoid ointments unless they are prescribed. They retain moisture and may cause other infections.    Don t pick at the sores. This can slow healing.    Don t touch your eyes when you have a sore. The virus may travel from your fingertips to your eyes.  Resources  American [employer] Association STI Hotline  279.118.6241  www.ashastd.org  Centers for Disease Control and Prevention  768.276.2843  www.cdc.gov/std   Date Last Reviewed: 1/1/2017 2000-2017 The "[employer]. [address] [place] [address], [place] [address]. All  "rights reserved. This information is not intended as a substitute for professional medical care. Always follow your healthcare professional's instructions.                Follow-ups after your visit        Who to contact     If you have questions or need follow up information about today's clinic visit or your schedule please contact [facility] directly at [phone].  Normal or non-critical lab and imaging results will be communicated to you by MyChart, letter or phone within 4 business days after the clinic has received the results. If you do not hear from us within 7 days, please contact the clinic through MyChart or phone. If you have a critical or abnormal lab result, we will notify you by phone as soon as possible.  Submit refill requests through [employer] or call your pharmacy and they will forward the refill request to us. Please allow 3 business days for your refill to be completed.          Additional Information About Your Visit        MyChart Information     [employer] lets you send messages to your doctor, view your test results, renew your prescriptions, schedule appointments and more. To sign up, go to [email or web address] . Click on \"Log in\" on the left side of the screen, which will take you to the Welcome page. Then click on \"Sign up Now\" on the right side of the page.     You will be asked to enter the access code listed below, as well as some personal information. Please follow the directions to create your username and password.     Your access code is: [email or web address]  Expires: 2017 10:48 AM     Your access code will  in 90 days. If you need help or a new code, please call your [facility] or [phone].        Care EveryWhere ID     This is your Care EveryWhere ID. This could be used by other organizations to access your [place] medical records  [id number]        Your Vitals Were     Pulse Temperature Respirations Pulse Oximetry BMI (Body Mass Index)       95 " 99.2  F (37.3  C) (Temporal) 16 97% 39.31 kg/m2        Blood Pressure from Last 3 Encounters:   11/29/17 126/80   09/14/17 139/87   07/11/17 135/85    Weight from Last 3 Encounters:   11/29/17 229 lb (103.9 kg)   11/08/17 224 lb 12.8 oz (102 kg)   09/14/17 221 lb (100.2 kg)              We Performed the Following     Beta HCG qual IFA [facility]          Today's Medication Changes          These changes are accurate as of: 11/29/17  1:30 PM.  If you have any questions, ask your nurse or doctor.               Start taking these medicines.        Dose/Directions    docosanol 10 % Crea cream   Commonly known as:  ABREVA   Used for:  Herpes labialis   Started by:  Pablo Landry PA-C        Apply topically 5 times daily   Quantity:  2 g   Refills:  0            Where to get your medicines      These medications were sent to [facility] [address] [facility]  [address] [facility] [address]     Phone:  [phone]     docosanol 10 % Crea cream                Primary Care Provider Office Phone # Fax #    Kevin Brewster [phone] [address] [address] [facility] [address]        Equal Access to Services     LEVI SEVILLA : Hadii marilia ku hadasho Soomaali, waaxda luqadaha, qaybta kaalmada adeegyada, jassi ayala. So [facility] [phone].    ATENCIÓN: Si habla español, tiene a cruz disposición servicios gratuitos de asistencia lingüística. Krissy al [phone].    We comply with applicable federal civil rights laws and [place] laws. We do not discriminate on the basis of race, color, national origin, age, disability, sex, sexual orientation, or gender identity.            Thank you!     Thank you for choosing [facility]  for your care. Our goal is always to provide you with excellent care. Hearing back from our patients is one way we can continue to improve our services. Please take a few minutes  to complete the written survey that you may receive in the mail after your visit with us. Thank you!             Your Updated Medication List - Protect others around you: Learn how to safely use, store and throw away your medicines at www.disposemymeds.org.          This list is accurate as of: 11/29/17  1:30 PM.  Always use your most recent med list.                   Brand Name Dispense Instructions for use Diagnosis    docosanol 10 % Crea cream    ABREVA    2 g    Apply topically 5 times daily    Herpes labialis       fluticasone 50 MCG/ACT spray    FLONASE    1 g    Spray 1-2 sprays into both nostrils daily    Sinusitis, unspecified chronicity, unspecified location       ondansetron 4 MG tablet    ZOFRAN    18 tablet    Take 1 tablet (4 mg) by mouth every 8 hours as needed for nausea    Gastroenteritis

## 2017-12-22 ENCOUNTER — HEALTH MAINTENANCE LETTER (OUTPATIENT)
Age: 22
End: 2017-12-22

## 2018-02-20 ENCOUNTER — TELEPHONE (OUTPATIENT)
Dept: FAMILY MEDICINE | Facility: OTHER | Age: 23
End: 2018-02-20

## 2018-02-20 NOTE — TELEPHONE ENCOUNTER
"Reason for call:  Patient reporting a symptom    Symptom or request: vaginal \"issue\"     Duration (how long have symptoms been present): 3-4 days     Have you been treated for this before? No    Additional comments: Would like to be seen today in [place]. Declined openings in [place].     Phone Number patient can be reached at:  Home number on file [phone] (home)    Best Time:  aqny     Can we leave a detailed message on this number:  YES    Call taken on 2/20/2018 at 11:43 AM by Frida Anderson    "

## 2018-02-20 NOTE — TELEPHONE ENCOUNTER
Clare Hummel is a 22 year old female who calls with vaginal concerns.    NURSING ASSESSMENT:  Description:  Having vaginal discharge that is watery clear, feels like her period due to the amount. Took a pregnancy test 3-4 weeks ago that was negative. Sexually active without protection. Vaginal discomfort. Pain with urination for 4 days. About 2 days had white discharge. Denies vaginal itching, fevers, chills, sweats, odor.   Onset/duration:  3-4 days   Precip. factors:  Sexually active   Associated symptoms:  Discharge, dysuria, discomfort  Improves/worsens symptoms:  Worsening   Pain scale (0-10)   Mild to moderate   LMP/preg/breast feedin2018 for 3 days, usually has it for 5 days - past 2 months has been lighter and shorter  Last exam/Treatment:  2017  Allergies:   Allergies   Allergen Reactions     Benadryl Allergy Itching     Penicillins      rash     Sulfa Drugs      rash     NURSING PLAN: Nursing advice to patient to be evaluation in clinic    RECOMMENDED DISPOSITION:  See in 24 hours - for concerns   Will comply with recommendation: Yes  If further questions/concerns or if symptoms do not improve, worsen or new symptoms develop, call your PCP or [place] [facility] as soon as possible.    NOTES:  Disposition was determined by the first positive assessment question, therefore all previous assessment questions were negative    Guideline used:  Telephone Triage Protocols for Nurses, Fifth Edition, Suzie Miller  Vaginal discharge/pain/itching  Nursing Judgment    Katie Resendiz, RN, BSN

## 2018-03-03 ENCOUNTER — OFFICE VISIT (OUTPATIENT)
Dept: URGENT CARE | Facility: URGENT CARE | Age: 23
End: 2018-03-03
Payer: COMMERCIAL

## 2018-03-03 ENCOUNTER — TELEPHONE (OUTPATIENT)
Dept: FAMILY MEDICINE | Facility: CLINIC | Age: 23
End: 2018-03-03

## 2018-03-03 VITALS
WEIGHT: 233.5 LBS | HEIGHT: 64 IN | OXYGEN SATURATION: 97 % | DIASTOLIC BLOOD PRESSURE: 80 MMHG | BODY MASS INDEX: 39.86 KG/M2 | HEART RATE: 112 BPM | SYSTOLIC BLOOD PRESSURE: 125 MMHG | TEMPERATURE: 98.7 F

## 2018-03-03 DIAGNOSIS — R11.10 VOMITING AND DIARRHEA: ICD-10-CM

## 2018-03-03 DIAGNOSIS — R19.7 VOMITING AND DIARRHEA: ICD-10-CM

## 2018-03-03 DIAGNOSIS — J10.1 INFLUENZA B: Primary | ICD-10-CM

## 2018-03-03 DIAGNOSIS — R07.0 THROAT PAIN: ICD-10-CM

## 2018-03-03 LAB
DEPRECATED S PYO AG THROAT QL EIA: NORMAL
FLUAV+FLUBV AG SPEC QL: NEGATIVE
FLUAV+FLUBV AG SPEC QL: POSITIVE
SPECIMEN SOURCE: ABNORMAL
SPECIMEN SOURCE: NORMAL

## 2018-03-03 PROCEDURE — 87081 CULTURE SCREEN ONLY: CPT | Performed by: FAMILY MEDICINE

## 2018-03-03 PROCEDURE — 87804 INFLUENZA ASSAY W/OPTIC: CPT | Performed by: FAMILY MEDICINE

## 2018-03-03 PROCEDURE — 87880 STREP A ASSAY W/OPTIC: CPT | Performed by: FAMILY MEDICINE

## 2018-03-03 PROCEDURE — 99214 OFFICE O/P EST MOD 30 MIN: CPT | Performed by: FAMILY MEDICINE

## 2018-03-03 RX ORDER — ONDANSETRON 4 MG/1
4 TABLET, ORALLY DISINTEGRATING ORAL EVERY 8 HOURS PRN
Qty: 20 TABLET | Refills: 1 | Status: SHIPPED | OUTPATIENT
Start: 2018-03-03 | End: 2018-06-29

## 2018-03-03 RX ORDER — OSELTAMIVIR PHOSPHATE 75 MG/1
75 CAPSULE ORAL 2 TIMES DAILY
Qty: 10 CAPSULE | Refills: 0 | Status: SHIPPED | OUTPATIENT
Start: 2018-03-03 | End: 2018-06-29

## 2018-03-03 NOTE — PROGRESS NOTES
"    SUBJECTIVE:                                                    Clare Hummel is a 22 year old female who presents to clinic today for the following health issues:      ENT Symptoms             Symptoms: cc Present Absent Comment   Fever/Chills  x  chills   Fatigue  x     Muscle Aches  x     Eye Irritation  x  Eyes hurt   Sneezing   x    Nasal Cleve/Drg  x     Sinus Pressure/Pain   x    Loss of smell   x    Dental pain  x     Sore Throat  x     Swollen Glands  x     Ear Pain/Fullness  x     Cough x      Wheeze  x     Chest Pain   x Did yesterday though   Shortness of breath  x  Kind of part of the cough   Rash       Other         Symptom duration:  Yesterday morning   Symptom severity:  Severe   Treatments tried:  Nyquil and tylenol   Contacts:  niece     Yesterday morning started with a cough and sore throat  Feel very tired  Then fevers and chills started   Also headache hurts every time she coughs    Had vomiting 4 times today  And had diarrhea 2 times today  able to swallow liquids and solids -YES  other symptoms above  Rash: No  Has tried over the counter medications no relief  because of persistence, patient came in to be seen.    ROS:  denies any exertional chest pain or shortness of breath  denies any unusual rash or joint swelling  denies post-tussive emesis or pertussis like symptoms  Negative for constitutional, eye, ear, nose, throat, skin, respiratory, cardiac, and gastrointestinal other than those outlined in the HPI.    PMH: chart reviewed  FH: chart reviewed    SH: chart reviewed and as above   Physical Exam:   /80 (BP Location: Right arm, Patient Position: Sitting, Cuff Size: Adult Large)  Pulse 112  Temp 98.7  F (37.1  C) (Oral)  Ht 5' 4\" (1.626 m)  Wt 233 lb 8 oz (105.9 kg)  SpO2 97%  BMI 40.08 kg/m2  General : Awake Alert not in any acute cardiorespiratory distress  Head:       Normocephalic Atraumatic  Eyes:    Pupils equally reactive to light and accomodation. Sclera not " icteric.   ENT:   midline nasal septum, mild nasal congestion, sinuses non-tender  left ear: no tragal tenderness, no mastoid tenderness, normal EAC, normal TM  right ear: left ear: no tragal tenderness, no mastoid tenderness, normal EAC, normal TM  mouth moist buccal mucosa, Yes hyperemic posterior pharyngeal wall, no trismus  tonsils: tonsils are present and normal  anterior cervical nodes: No tender  posterior cervical nodes: No  palpable  Heart:  Regular in rate and rhythm, no murmurs rubs or gallops  Lungs: Symmetrical Chest Expansion, no retractions, clear breath sounds  Abdomen: soft, no hepatosplenomegally  Psych: Appropriate mood and affect. Pleasant. No thoughts of harming self or others   Neuro: Cranial nerves were intact. MMT 5/5 bilateral upper and lower extremities. Sensory was intact. No gross neurologic deficits. Normal gait and cerebellar function. No meningeal signs  Skin: patient undressed to level of his/her comfort. No visible concerning lesions.    Labs:   Diagnostic Test Results:  Results for orders placed or performed in visit on 03/03/18 (from the past 24 hour(s))   Rapid strep screen   Result Value Ref Range    Specimen Description Throat     Rapid Strep A Screen       NEGATIVE: No Group A streptococcal antigen detected by immunoassay, await culture report.   Influenza A/B antigen   Result Value Ref Range    Influenza A/B Agn Specimen Nasal     Influenza A Negative NEG^Negative    Influenza B Positive (A) NEG^Negative         ICD-10-CM    1. Influenza B J10.1 Influenza A/B antigen     oseltamivir (TAMIFLU) 75 MG capsule   2. Throat pain R07.0 Rapid strep screen     Beta strep group A culture   3. Vomiting and diarrhea R11.10 ondansetron (ZOFRAN ODT) 4 MG ODT tab    R19.7      Prescribed with tamiflu  Prescribed with zofran as needed nausea and vomiting  Alarm signs or symptoms discussed, if present recommend go to ER   supportive treatment: advised supportive treatment, Advised to come back  in if with any worsening symptoms or if not better despite supportive measures. Especially if with any worsening sore throat, inability to eat or drink or swallow, or trismus. Symptoms of peritonsillar abscess discussed. Patient voiced understanding.adverse reactions of medication discussed  OTC medications discussed  advised to come back in right away if with any worsening symptoms or if with no relief despite treatment plan  patient voiced understanding and had no further questions at this time.

## 2018-03-03 NOTE — MR AVS SNAPSHOT
"              After Visit Summary   3/3/2018    Clare Hummel    MRN: [id number]           Patient Information     Date Of Birth          1995        Visit Information        Provider Department      3/3/2018 10:00 AM Landy Dinero MD [facility]        Today's Diagnoses     Influenza B    -  1    Throat pain        Vomiting and diarrhea           Follow-ups after your visit        Who to contact     If you have questions or need follow up information about today's clinic visit or your schedule please contact [facility] directly at [phone].  Normal or non-critical lab and imaging results will be communicated to you by [employer], letter or phone within 4 business days after the clinic has received the results. If you do not hear from us within 7 days, please contact the clinic through ABB[employer] or phone. If you have a critical or abnormal lab result, we will notify you by phone as soon as possible.  Submit refill requests through [facility] or call your pharmacy and they will forward the refill request to us. Please allow 3 business days for your refill to be completed.          Additional Information About Your Visit        MyChart Information     [facility] [facility] lets you send messages to your doctor, view your test results, renew your prescriptions, schedule appointments and more. To sign up, go to [email or web address] . Click on \"Log in\" on the left side of the screen, which will take you to the Welcome page. Then click on \"Sign up Now\" on the right side of the page.     You will be asked to enter the access code listed below, as well as some personal information. Please follow the directions to create your username and password.     Your access code is: [email or web address]  Expires: 2018 11:55 AM     Your access code will  in 90 days. If you need help or a new code, please call your [facility] or [phone].        Care EveryWhere ID     This is your Care " "EveryWhere ID. This could be used by other organizations to access your [place] medical records  [id number]        Your Vitals Were     Pulse Temperature Height Pulse Oximetry BMI (Body Mass Index)       112 98.7  F (37.1  C) (Oral) 5' 4\" (1.626 m) 97% 40.08 kg/m2        Blood Pressure from Last 3 Encounters:   03/03/18 125/80   11/29/17 126/80   09/14/17 139/87    Weight from Last 3 Encounters:   03/03/18 233 lb 8 oz (105.9 kg)   11/29/17 229 lb (103.9 kg)   11/08/17 224 lb 12.8 oz (102 kg)              We Performed the Following     Beta strep group A culture     Influenza A/B antigen     Rapid strep screen          Today's Medication Changes          These changes are accurate as of 3/3/18 11:55 AM.  If you have any questions, ask your nurse or doctor.               Start taking these medicines.        Dose/Directions    ondansetron 4 MG ODT tab   Commonly known as:  ZOFRAN ODT   Used for:  Vomiting and diarrhea   Started by:  Landy Dinero MD        Dose:  4 mg   Take 1 tablet (4 mg) by mouth every 8 hours as needed for nausea   Quantity:  20 tablet   Refills:  1       oseltamivir 75 MG capsule   Commonly known as:  TAMIFLU   Used for:  Influenza B   Started by:  Landy Dinero MD        Dose:  75 mg   Take 1 capsule (75 mg) by mouth 2 times daily   Quantity:  10 capsule   Refills:  0            Where to get your medicines      These medications were sent to [facility] [place], [place] - [address] [facility]  [address] [facility] [address]     Phone:  [phone]     ondansetron 4 MG ODT tab    oseltamivir 75 MG capsule                Primary Care Provider Office Phone # Fax #    Kevin Brewster [phone] [address] [address] [facility] [address]        Equal Access to Services     LEVI SEVILLA AH: Hadii marilia monreal hadasho Soomaali, waaxda luqadaha, qaybta kaalmada adeegyada, waxay naz ayala. So wa " [phone].    ATENCIÓN: Si ranchola carol, tiene a cruz disposición servicios gratuitos de asistencia lingüística. Krissy al [phone].    We comply with applicable federal civil rights laws and [place] laws. We do not discriminate on the basis of race, color, national origin, age, disability, sex, sexual orientation, or gender identity.            Thank you!     Thank you for choosing [facility]  for your care. Our goal is always to provide you with excellent care. Hearing back from our patients is one way we can continue to improve our services. Please take a few minutes to complete the written survey that you may receive in the mail after your visit with us. Thank you!             Your Updated Medication List - Protect others around you: Learn how to safely use, store and throw away your medicines at www.disposemymeds.org.          This list is accurate as of 3/3/18 11:55 AM.  Always use your most recent med list.                   Brand Name Dispense Instructions for use Diagnosis    ondansetron 4 MG ODT tab    ZOFRAN ODT    20 tablet    Take 1 tablet (4 mg) by mouth every 8 hours as needed for nausea    Vomiting and diarrhea       oseltamivir 75 MG capsule    TAMIFLU    10 capsule    Take 1 capsule (75 mg) by mouth 2 times daily    Influenza B

## 2018-03-03 NOTE — NURSING NOTE
"Chief Complaint   Patient presents with     Flu       Initial /80 (BP Location: Right arm, Patient Position: Sitting, Cuff Size: Adult Large)  Pulse 112  Temp 98.7  F (37.1  C) (Oral)  Ht 5' 4\" (1.626 m)  Wt 233 lb 8 oz (105.9 kg)  SpO2 97%  BMI 40.08 kg/m2 Estimated body mass index is 40.08 kg/(m^2) as calculated from the following:    Height as of this encounter: 5' 4\" (1.626 m).    Weight as of this encounter: 233 lb 8 oz (105.9 kg).  Medication Reconciliation: complete     Erika SERRANO, Certified Medical Assistant (AAMA)March 3, 2018 10:32 AM      "

## 2018-03-03 NOTE — LETTER
March 5, 2018      Clare Hummel  [address] [facility] [address]        Dear ,    We are writing to inform you of your test results.    Your test results fall within the expected range(s) or remain unchanged from previous results.  Please continue with current treatment plan.    Resulted Orders   Rapid strep screen   Result Value Ref Range    Specimen Description Throat     Rapid Strep A Screen       NEGATIVE: No Group A streptococcal antigen detected by immunoassay, await culture report.   Influenza A/B antigen   Result Value Ref Range    Influenza A/B Agn Specimen Nasal     Influenza A Negative NEG^Negative    Influenza B Positive (A) NEG^Negative      Comment:      Test results must be correlated with clinical data. If necessary, results   should be confirmed by a molecular assay or viral culture.     Beta strep group A culture   Result Value Ref Range    Specimen Description Throat     Culture Micro No beta hemolytic Streptococcus Group A isolated        If you have any questions or concerns, please call the clinic at the number listed above.       Sincerely,        Landy Dinero MD

## 2018-03-04 LAB
BACTERIA SPEC CULT: NORMAL
SPECIMEN SOURCE: NORMAL

## 2018-06-29 ENCOUNTER — OFFICE VISIT (OUTPATIENT)
Dept: FAMILY MEDICINE | Facility: CLINIC | Age: 23
End: 2018-06-29
Payer: COMMERCIAL

## 2018-06-29 VITALS
DIASTOLIC BLOOD PRESSURE: 81 MMHG | HEART RATE: 70 BPM | TEMPERATURE: 98.7 F | BODY MASS INDEX: 39.03 KG/M2 | OXYGEN SATURATION: 97 % | SYSTOLIC BLOOD PRESSURE: 122 MMHG | WEIGHT: 227.4 LBS | RESPIRATION RATE: 18 BRPM

## 2018-06-29 DIAGNOSIS — J01.00 ACUTE MAXILLARY SINUSITIS, RECURRENCE NOT SPECIFIED: ICD-10-CM

## 2018-06-29 DIAGNOSIS — J45.20 ASTHMATIC BRONCHITIS, MILD INTERMITTENT, UNCOMPLICATED: Primary | ICD-10-CM

## 2018-06-29 PROCEDURE — 99214 OFFICE O/P EST MOD 30 MIN: CPT | Performed by: PHYSICIAN ASSISTANT

## 2018-06-29 RX ORDER — CETIRIZINE HYDROCHLORIDE 10 MG/1
10 TABLET ORAL EVERY EVENING
Qty: 30 TABLET | Refills: 1 | Status: SHIPPED | OUTPATIENT
Start: 2018-06-29 | End: 2019-01-21

## 2018-06-29 RX ORDER — ALBUTEROL SULFATE 90 UG/1
2 AEROSOL, METERED RESPIRATORY (INHALATION) EVERY 4 HOURS PRN
Qty: 1 INHALER | Refills: 0 | Status: SHIPPED | OUTPATIENT
Start: 2018-06-29

## 2018-06-29 RX ORDER — BENZONATATE 200 MG/1
200 CAPSULE ORAL 3 TIMES DAILY PRN
Qty: 21 CAPSULE | Refills: 0 | Status: SHIPPED | OUTPATIENT
Start: 2018-06-29 | End: 2018-07-12

## 2018-06-29 RX ORDER — AZITHROMYCIN 250 MG/1
TABLET, FILM COATED ORAL
Qty: 6 TABLET | Refills: 0 | Status: SHIPPED | OUTPATIENT
Start: 2018-06-29 | End: 2018-07-12

## 2018-06-29 ASSESSMENT — ANXIETY QUESTIONNAIRES
6. BECOMING EASILY ANNOYED OR IRRITABLE: NEARLY EVERY DAY
3. WORRYING TOO MUCH ABOUT DIFFERENT THINGS: SEVERAL DAYS
7. FEELING AFRAID AS IF SOMETHING AWFUL MIGHT HAPPEN: SEVERAL DAYS
5. BEING SO RESTLESS THAT IT IS HARD TO SIT STILL: SEVERAL DAYS
1. FEELING NERVOUS, ANXIOUS, OR ON EDGE: MORE THAN HALF THE DAYS
IF YOU CHECKED OFF ANY PROBLEMS ON THIS QUESTIONNAIRE, HOW DIFFICULT HAVE THESE PROBLEMS MADE IT FOR YOU TO DO YOUR WORK, TAKE CARE OF THINGS AT HOME, OR GET ALONG WITH OTHER PEOPLE: SOMEWHAT DIFFICULT
GAD7 TOTAL SCORE: 11
2. NOT BEING ABLE TO STOP OR CONTROL WORRYING: SEVERAL DAYS

## 2018-06-29 ASSESSMENT — ENCOUNTER SYMPTOMS
SHORTNESS OF BREATH: 1
DIARRHEA: 1
CONSTITUTIONAL NEGATIVE: 1
PALPITATIONS: 0
FEVER: 0
WHEEZING: 1
EYE PAIN: 0
DIAPHORESIS: 0
WEIGHT LOSS: 0
VOMITING: 1
CARDIOVASCULAR NEGATIVE: 1
SPUTUM PRODUCTION: 1
COUGH: 1
SINUS PAIN: 1
HEMOPTYSIS: 0

## 2018-06-29 ASSESSMENT — PAIN SCALES - GENERAL: PAINLEVEL: MODERATE PAIN (4)

## 2018-06-29 ASSESSMENT — PATIENT HEALTH QUESTIONNAIRE - PHQ9: 5. POOR APPETITE OR OVEREATING: MORE THAN HALF THE DAYS

## 2018-06-29 NOTE — MR AVS SNAPSHOT
After Visit Summary   6/29/2018    Clare Hummel    MRN: [id number]           Patient Information     Date Of Birth          1995        Visit Information        Provider Department      6/29/2018 11:00 AM Becky Hammond PA-C [facility]        Today's Diagnoses     Asthmatic bronchitis, mild intermittent, uncomplicated    -  1    Acute maxillary sinusitis, recurrence not specified           Follow-ups after your visit        Who to contact     If you have questions or need follow up information about today's clinic visit or your schedule please contact [facility] directly at [phone].  Normal or non-critical lab and imaging results will be communicated to you by MyChart, letter or phone within 4 business days after the clinic has received the results. If you do not hear from us within 7 days, please contact the clinic through MyChart or phone. If you have a critical or abnormal lab result, we will notify you by phone as soon as possible.  Submit refill requests through [employer] or call your pharmacy and they will forward the refill request to us. Please allow 3 business days for your refill to be completed.          Additional Information About Your Visit        Care EveryWhere ID     This is your Care EveryWhere ID. This could be used by other organizations to access your [facility] medical records  [id number]        Your Vitals Were     Pulse Temperature Respirations Pulse Oximetry BMI (Body Mass Index)       70 98.7  F (37.1  C) (Oral) 18 97% 39.03 kg/m2        Blood Pressure from Last 3 Encounters:   06/29/18 122/81   03/03/18 125/80   11/29/17 126/80    Weight from Last 3 Encounters:   06/29/18 227 lb 6.4 oz (103.1 kg)   03/03/18 233 lb 8 oz (105.9 kg)   11/29/17 229 lb (103.9 kg)              Today, you had the following     No orders found for display         Today's Medication Changes          These changes are accurate as of 6/29/18 11:18 AM.  If you  have any questions, ask your nurse or doctor.               Start taking these medicines.        Dose/Directions    albuterol 108 (90 Base) MCG/ACT Inhaler   Commonly known as:  PROAIR HFA/PROVENTIL HFA/VENTOLIN HFA   Used for:  Asthmatic bronchitis, mild intermittent, uncomplicated   Started by:  Becky Hammond PA-C        Dose:  2 puff   Inhale 2 puffs into the lungs every 4 hours as needed for shortness of breath / dyspnea or wheezing   Quantity:  1 Inhaler   Refills:  0       azithromycin 250 MG tablet   Commonly known as:  ZITHROMAX   Used for:  Asthmatic bronchitis, mild intermittent, uncomplicated   Started by:  Becky Hammond PA-C        2 tablets the first day, then 1 tablet daily for the next 4 days   Quantity:  6 tablet   Refills:  0       benzonatate 200 MG capsule   Commonly known as:  TESSALON   Used for:  Asthmatic bronchitis, mild intermittent, uncomplicated   Started by:  Becky Hammond PA-C        Dose:  200 mg   Take 1 capsule (200 mg) by mouth 3 times daily as needed for cough   Quantity:  21 capsule   Refills:  0       cetirizine 10 MG tablet   Commonly known as:  zyrTEC   Used for:  Acute maxillary sinusitis, recurrence not specified   Started by:  Becky Hammond PA-C        Dose:  10 mg   Take 1 tablet (10 mg) by mouth every evening Sinus congestion   Quantity:  30 tablet   Refills:  1            Where to get your medicines      These medications were sent to [facility] [address] [place], [place] - [address] [facility]  [address] [facility] [address]     Phone:  [phone]     albuterol 108 (90 Base) MCG/ACT Inhaler    azithromycin 250 MG tablet    benzonatate 200 MG capsule    cetirizine 10 MG tablet                Primary Care Provider Office Phone # Fax #    Kevin Brewster [phone] [address] [address] [facility] [address]        Equal Access to Services     LEVI SEVILLA AH: Arcenio Long, dayan ballard, nilesh corral,  jassi rodriguezkamille reynolds'aan ah. So [facility] [phone].    ATENCIÓN: Si habla carol, tiene a cruz disposición servicios gratuitos de asistencia lingüística. Krissy duncan [phone].    We comply with applicable federal civil rights laws and [place] laws. We do not discriminate on the basis of race, color, national origin, age, disability, sex, sexual orientation, or gender identity.            Thank you!     Thank you for choosing [facility]  for your care. Our goal is always to provide you with excellent care. Hearing back from our patients is one way we can continue to improve our services. Please take a few minutes to complete the written survey that you may receive in the mail after your visit with us. Thank you!             Your Updated Medication List - Protect others around you: Learn how to safely use, store and throw away your medicines at www.disposemymeds.org.          This list is accurate as of 6/29/18 11:18 AM.  Always use your most recent med list.                   Brand Name Dispense Instructions for use Diagnosis    albuterol 108 (90 Base) MCG/ACT Inhaler    PROAIR HFA/PROVENTIL HFA/VENTOLIN HFA    1 Inhaler    Inhale 2 puffs into the lungs every 4 hours as needed for shortness of breath / dyspnea or wheezing    Asthmatic bronchitis, mild intermittent, uncomplicated       azithromycin 250 MG tablet    ZITHROMAX    6 tablet    2 tablets the first day, then 1 tablet daily for the next 4 days    Asthmatic bronchitis, mild intermittent, uncomplicated       benzonatate 200 MG capsule    TESSALON    21 capsule    Take 1 capsule (200 mg) by mouth 3 times daily as needed for cough    Asthmatic bronchitis, mild intermittent, uncomplicated       cetirizine 10 MG tablet    zyrTEC    30 tablet    Take 1 tablet (10 mg) by mouth every evening Sinus congestion    Acute maxillary sinusitis, recurrence not specified

## 2018-06-29 NOTE — NURSING NOTE
"Chief Complaint   Patient presents with     Sinus Problem     Health Maintenance     PHQ9       Initial /81  Pulse 70  Temp 98.7  F (37.1  C) (Oral)  Resp 18  Wt 227 lb 6.4 oz (103.1 kg)  SpO2 97%  BMI 39.03 kg/m2 Estimated body mass index is 39.03 kg/(m^2) as calculated from the following:    Height as of 3/3/18: 5' 4\" (1.626 m).    Weight as of this encounter: 227 lb 6.4 oz (103.1 kg).  Medication Reconciliation: complete  Linda Cardona CMA    "

## 2018-06-29 NOTE — LETTER
[facility]  [address] [facility] [address]  [phone]    2018    Re: Clare Hummel  [address] [facility] [address]  [phone] (home)     : 1995      To Whom It May Concern:      Clare Hummel was seen in clinic today and is unable to work today.  Please feel free to contact me via phone if you have any questions or concerns.        Sincerely,      Becky Hammond PA-C

## 2018-06-29 NOTE — PROGRESS NOTES
SUBJECTIVE:     HPI  Clare Hummel is a 22 year old female who presents to clinic today for the following health issues:  SINUS SYMPTOMS    Duration: 4 days     Description  Productive cough along with shortness of breath and wheezing.  No hemoptysis.,    Severity: moderate    Accompanying signs and symptoms:  Also reports nasal congestion, headache, facial pressure, vomiting and diarrhea. No abdominal pain, constipation, bloody or black tarry stools.  No fever, chills or sweats.  No suspicious foods, recent travel/abx or ill contacts.    History (predisposing factors):  No ill contacts.  Mild asthma.  Smoker.    Precipitating or alleviating factors: None    Therapies tried and outcome:  Ibuprofen, rest  - little relief       Reviewed PMH.  Patient Active Problem List   Diagnosis     ADD (attention deficit disorder)     Generalized anxiety disorder     Major depressive disorder, single episode, moderate (H)     No current outpatient prescriptions on file.     Allergies   Allergen Reactions     Benadryl Allergy Itching     Penicillins      rash     Sulfa Drugs      rash       Review of Systems   Constitutional: Negative.  Negative for diaphoresis, fever and weight loss.   HENT: Positive for congestion and sinus pain.    Eyes: Negative for pain.   Respiratory: Positive for cough, sputum production, shortness of breath and wheezing. Negative for hemoptysis.    Cardiovascular: Negative.  Negative for chest pain and palpitations.   Gastrointestinal: Positive for diarrhea and vomiting.   Skin: Negative.    All other systems reviewed and are negative.      /81  Pulse 70  Temp 98.7  F (37.1  C) (Oral)  Resp 18  Wt 227 lb 6.4 oz (103.1 kg)  SpO2 97%  BMI 39.03 kg/m2  Physical Exam   Constitutional: She is oriented to person, place, and time and well-developed, well-nourished, and in no distress. No distress.   HENT:   Head: Normocephalic and atraumatic.   Nose: Mucosal edema and rhinorrhea present. No nasal  deformity or septal deviation. No epistaxis.  No foreign bodies. Right sinus exhibits maxillary sinus tenderness and frontal sinus tenderness. Left sinus exhibits maxillary sinus tenderness and frontal sinus tenderness.   Mouth/Throat: Uvula is midline and oropharynx is clear and moist. No oropharyngeal exudate or posterior oropharyngeal erythema.   TMs are intact without any erythema or bulging bilaterally.  Airway is patent.   Eyes: Conjunctivae and EOM are normal. Pupils are equal, round, and reactive to light. No scleral icterus.   Neck: Normal range of motion. Neck supple. No thyromegaly present.   Cardiovascular: Normal rate, regular rhythm, normal heart sounds and intact distal pulses.  Exam reveals no gallop and no friction rub.    No murmur heard.  Pulmonary/Chest: Effort normal and breath sounds normal. No accessory muscle usage. No respiratory distress. She has no decreased breath sounds. She has no wheezes. She has no rhonchi. She has no rales.   Lymphadenopathy:     She has no cervical adenopathy.   Neurological: She is alert and oriented to person, place, and time.   Skin: Skin is warm and dry. No cyanosis. Nails show no clubbing.   Psychiatric: Mood and affect normal.   Nursing note and vitals reviewed.        Assessment/Plan:  Asthmatic bronchitis, mild intermittent, uncomplicated:  Will treat with zithromax X5days, tessalon perles, and albuterol inh as needed for symptoms.  Recommend treatment with rest, fluids and chicken soup. Tylenol/ibuprofen prn fever/pain.  Recheck in clinic if symptoms worsen or if symptoms do not improve.  To the ER if he develops hemoptysis, chest pain, fevers>102, worsening shortness of breath/wheezing.    -     azithromycin (ZITHROMAX) 250 MG tablet; 2 tablets the first day, then 1 tablet daily for the next 4 days  -     albuterol (PROAIR HFA/PROVENTIL HFA/VENTOLIN HFA) 108 (90 Base) MCG/ACT Inhaler; Inhale 2 puffs into the lungs every 4 hours as needed for shortness of  breath / dyspnea or wheezing  -     benzonatate (TESSALON) 200 MG capsule; Take 1 capsule (200 mg) by mouth 3 times daily as needed for cough    Acute maxillary sinusitis, recurrence not specified:  Will concurrently treat with zithromax above.  Recommend tylenol/ibuprofen prn pain/fever and zyrtec/pseudofed for sinus congestion.    -     cetirizine (ZYRTEC) 10 MG tablet; Take 1 tablet (10 mg) by mouth every evening Sinus congestion          Becky Hammond PA-C

## 2018-06-30 ASSESSMENT — ANXIETY QUESTIONNAIRES: GAD7 TOTAL SCORE: 11

## 2018-06-30 ASSESSMENT — PATIENT HEALTH QUESTIONNAIRE - PHQ9: SUM OF ALL RESPONSES TO PHQ QUESTIONS 1-9: 7

## 2018-07-12 ENCOUNTER — TELEPHONE (OUTPATIENT)
Dept: FAMILY MEDICINE | Facility: OTHER | Age: 23
End: 2018-07-12

## 2018-07-12 ENCOUNTER — OFFICE VISIT (OUTPATIENT)
Dept: FAMILY MEDICINE | Facility: OTHER | Age: 23
End: 2018-07-12
Payer: COMMERCIAL

## 2018-07-12 VITALS
DIASTOLIC BLOOD PRESSURE: 84 MMHG | TEMPERATURE: 97.7 F | BODY MASS INDEX: 38.9 KG/M2 | RESPIRATION RATE: 16 BRPM | WEIGHT: 226.6 LBS | SYSTOLIC BLOOD PRESSURE: 116 MMHG | HEART RATE: 104 BPM

## 2018-07-12 DIAGNOSIS — R10.84 ABDOMINAL PAIN, GENERALIZED: Primary | ICD-10-CM

## 2018-07-12 LAB
ALBUMIN UR-MCNC: NEGATIVE MG/DL
APPEARANCE UR: CLEAR
BASOPHILS # BLD AUTO: 0 10E9/L (ref 0–0.2)
BASOPHILS NFR BLD AUTO: 0.3 %
BETA HCG QUAL IFA URINE: NEGATIVE
BILIRUB UR QL STRIP: NEGATIVE
COLOR UR AUTO: YELLOW
DIFFERENTIAL METHOD BLD: NORMAL
EOSINOPHIL # BLD AUTO: 0.1 10E9/L (ref 0–0.7)
EOSINOPHIL NFR BLD AUTO: 2.3 %
ERYTHROCYTE [DISTWIDTH] IN BLOOD BY AUTOMATED COUNT: 12.9 % (ref 10–15)
GLUCOSE UR STRIP-MCNC: NEGATIVE MG/DL
HCT VFR BLD AUTO: 40.2 % (ref 35–47)
HGB BLD-MCNC: 13.6 G/DL (ref 11.7–15.7)
HGB UR QL STRIP: NEGATIVE
KETONES UR STRIP-MCNC: NEGATIVE MG/DL
LEUKOCYTE ESTERASE UR QL STRIP: NEGATIVE
LYMPHOCYTES # BLD AUTO: 2.1 10E9/L (ref 0.8–5.3)
LYMPHOCYTES NFR BLD AUTO: 36.7 %
MCH RBC QN AUTO: 30.8 PG (ref 26.5–33)
MCHC RBC AUTO-ENTMCNC: 33.8 G/DL (ref 31.5–36.5)
MCV RBC AUTO: 91 FL (ref 78–100)
MONOCYTES # BLD AUTO: 0.4 10E9/L (ref 0–1.3)
MONOCYTES NFR BLD AUTO: 7.1 %
NEUTROPHILS # BLD AUTO: 3.1 10E9/L (ref 1.6–8.3)
NEUTROPHILS NFR BLD AUTO: 53.6 %
NITRATE UR QL: NEGATIVE
PH UR STRIP: 6.5 PH (ref 5–7)
PLATELET # BLD AUTO: 195 10E9/L (ref 150–450)
RBC # BLD AUTO: 4.42 10E12/L (ref 3.8–5.2)
SOURCE: NORMAL
SP GR UR STRIP: 1.02 (ref 1–1.03)
UROBILINOGEN UR STRIP-ACNC: 0.2 EU/DL (ref 0.2–1)
WBC # BLD AUTO: 5.8 10E9/L (ref 4–11)

## 2018-07-12 PROCEDURE — 81003 URINALYSIS AUTO W/O SCOPE: CPT | Performed by: FAMILY MEDICINE

## 2018-07-12 PROCEDURE — 87491 CHLMYD TRACH DNA AMP PROBE: CPT | Performed by: FAMILY MEDICINE

## 2018-07-12 PROCEDURE — 84703 CHORIONIC GONADOTROPIN ASSAY: CPT | Performed by: FAMILY MEDICINE

## 2018-07-12 PROCEDURE — 36415 COLL VENOUS BLD VENIPUNCTURE: CPT | Performed by: FAMILY MEDICINE

## 2018-07-12 PROCEDURE — 87591 N.GONORRHOEAE DNA AMP PROB: CPT | Performed by: FAMILY MEDICINE

## 2018-07-12 PROCEDURE — 99214 OFFICE O/P EST MOD 30 MIN: CPT | Performed by: FAMILY MEDICINE

## 2018-07-12 PROCEDURE — 85025 COMPLETE CBC W/AUTO DIFF WBC: CPT | Performed by: FAMILY MEDICINE

## 2018-07-12 ASSESSMENT — PAIN SCALES - GENERAL: PAINLEVEL: NO PAIN (0)

## 2018-07-12 NOTE — TELEPHONE ENCOUNTER
Clare Hummel is a 22 year old female who calls with sinus pressure and abdominal pain.    NURSING ASSESSMENT:  Description:  Sinus pressure started 4 weeks.  Has been using OTC and helps a little but has been yellow and green phlegm.  Abdominal pan started yesterday.  Today is worse.  Did vomit x 2 and diarrhea. Has not been eating much. Below belly button and to left side. Walking ok.  Bending over hurts worse  Pain scale (0-10)   Comes in waves,  But doing ok right now.  LMP/preg/breast feeding:  She is approx 7 days late.    Allergies:   Allergies   Allergen Reactions     Benadryl Allergy Itching     Penicillins      rash     Sulfa Drugs      rash         NURSING PLAN: Nursing advice to patient needs to be seen for sinus issues,  however she is 7 days late on her period and has not taken a home pregnancy test.    RECOMMENDED DISPOSITION:  See in 24 hours -   Will comply with recommendation: Yes  If further questions/concerns or if symptoms do not improve, worsen or new symptoms develop, call your PCP or [facility] as soon as possible.      Guideline used:abdominal pain, sinus problem  Telephone Triage Protocols for Nurses, Fifth Edition, Suzie Freeman, RN, BSN

## 2018-07-12 NOTE — MR AVS SNAPSHOT
After Visit Summary   7/12/2018    Clare Hummel    MRN: [id number]           Patient Information     Date Of Birth          1995        Visit Information        Provider Department      7/12/2018 12:40 PM Miquel Crane MD [facility]        Today's Diagnoses     Abdominal pain, generalized    -  1       Follow-ups after your visit        Who to contact     If you have questions or need follow up information about today's clinic visit or your schedule please contact [facility] directly at [phone].  Normal or non-critical lab and imaging results will be communicated to you by MyChart, letter or phone within 4 business days after the clinic has received the results. If you do not hear from us within 7 days, please contact the clinic through MyChart or phone. If you have a critical or abnormal lab result, we will notify you by phone as soon as possible.  Submit refill requests through [email or web address] or call your pharmacy and they will forward the refill request to us. Please allow 3 business days for your refill to be completed.          Additional Information About Your Visit        Care EveryWhere ID     This is your Care EveryWhere ID. This could be used by other organizations to access your [facility] medical records  [id number]        Your Vitals Were     Pulse Temperature Respirations Last Period BMI (Body Mass Index)       104 97.7  F (36.5  C) (Temporal) 16 06/04/2018 38.9 kg/m2        Blood Pressure from Last 3 Encounters:   07/12/18 116/84   06/29/18 122/81   03/03/18 125/80    Weight from Last 3 Encounters:   07/12/18 226 lb 9.6 oz (102.8 kg)   06/29/18 227 lb 6.4 oz (103.1 kg)   03/03/18 233 lb 8 oz (105.9 kg)              We Performed the Following     Beta HCG qual IFA urine     CBC with platelets and differential     CHLAMYDIA TRACHOMATIS PCR     NEISSERIA GONORRHOEA PCR     UA reflex to Microscopic        Primary Care Provider Office Phone # Fax #     Kevin Brewster [phone]       [address] [facility] [address]        Equal Access to Services     LEVI SEVILLA : Hadii aad ku hadkandi Long, tonyda heidirenetta, kapilta kaaaronda ellis, jassi de león laNikokamille ayala. So [facility] [phone].    ATENCIÓN: Si habla español, tiene a cruz disposición servicios gratuitos de asistencia lingüística. Llame al [phone].    We comply with applicable federal civil rights laws and [place] laws. We do not discriminate on the basis of race, color, national origin, age, disability, sex, sexual orientation, or gender identity.            Thank you!     Thank you for choosing [facility]  for your care. Our goal is always to provide you with excellent care. Hearing back from our patients is one way we can continue to improve our services. Please take a few minutes to complete the written survey that you may receive in the mail after your visit with us. Thank you!             Your Updated Medication List - Protect others around you: Learn how to safely use, store and throw away your medicines at www.disposemymeds.org.          This list is accurate as of 7/12/18  2:34 PM.  Always use your most recent med list.                   Brand Name Dispense Instructions for use Diagnosis    albuterol 108 (90 Base) MCG/ACT Inhaler    PROAIR HFA/PROVENTIL HFA/VENTOLIN HFA    1 Inhaler    Inhale 2 puffs into the lungs every 4 hours as needed for shortness of breath / dyspnea or wheezing    Asthmatic bronchitis, mild intermittent, uncomplicated       cetirizine 10 MG tablet    zyrTEC    30 tablet    Take 1 tablet (10 mg) by mouth every evening Sinus congestion    Acute maxillary sinusitis, recurrence not specified

## 2018-07-12 NOTE — PROGRESS NOTES
Subjective:  Patient is here today with diffuse abdominal pain across her lower quadrants bilaterally.  Her last menstrual period was normal she is sexually active with a new partner so she wants to be tested for STDs she also stated that she had some profuse diarrhea this morning  and some mild cramping with it.  No vomiting.  No fever chills.  She has not had exposure to contaminated water sources such as camping or hiking etc.  She denies any dysuria urinary frequency.  No other complaints at this time.    Objective:  Vital signs stable    Abdomen:  Soft, nontender to palpation, normal bowel sounds    Results for orders placed or performed in visit on 07/12/18 (from the past 24 hour(s))   CBC with platelets and differential   Result Value Ref Range    WBC 5.8 4.0 - 11.0 10e9/L    RBC Count 4.42 3.8 - 5.2 10e12/L    Hemoglobin 13.6 11.7 - 15.7 g/dL    Hematocrit 40.2 35.0 - 47.0 %    MCV 91 78 - 100 fl    MCH 30.8 26.5 - 33.0 pg    MCHC 33.8 31.5 - 36.5 g/dL    RDW 12.9 10.0 - 15.0 %    Platelet Count 195 150 - 450 10e9/L    Diff Method Automated Method     % Neutrophils 53.6 %    % Lymphocytes 36.7 %    % Monocytes 7.1 %    % Eosinophils 2.3 %    % Basophils 0.3 %    Absolute Neutrophil 3.1 1.6 - 8.3 10e9/L    Absolute Lymphocytes 2.1 0.8 - 5.3 10e9/L    Absolute Monocytes 0.4 0.0 - 1.3 10e9/L    Absolute Eosinophils 0.1 0.0 - 0.7 10e9/L    Absolute Basophils 0.0 0.0 - 0.2 10e9/L   UA reflex to Microscopic   Result Value Ref Range    Color Urine Yellow     Appearance Urine Clear     Glucose Urine Negative NEG^Negative mg/dL    Bilirubin Urine Negative NEG^Negative    Ketones Urine Negative NEG^Negative mg/dL    Specific Gravity Urine 1.020 1.003 - 1.035    Blood Urine Negative NEG^Negative    pH Urine 6.5 5.0 - 7.0 pH    Protein Albumin Urine Negative NEG^Negative mg/dL    Urobilinogen Urine 0.2 0.2 - 1.0 EU/dL    Nitrite Urine Negative NEG^Negative    Leukocyte Esterase Urine Negative NEG^Negative    Source  Midstream Urine    Beta HCG qual IFA urine   Result Value Ref Range    Beta HCG Qual IFA Urine Negative NEG^Negative        Assessment:  Abdominal pain cannot rule out gastroenteritis    Plan:  Patient was reassured that no obvious findings were found.  The patient is not pregnant.  Her urine was negative white count was normal GC and chlamydia are pending not sure the sample was obtained appropriately she absolutely refused to have a GC chlamydia swab done so she will need to consider this in the future if she has persistent symptoms.  She will follow-up with her primary provider if symptoms persist.      I spent 25 minutes with this patient over 50% of the time was in healthcare counseling, careplan development, strategies for partnering in keeping this patient healthy and appropriate referrals and follow up     Miquel Crane MD

## 2018-07-12 NOTE — TELEPHONE ENCOUNTER
Reason for call:  Patient reporting a symptom    Symptom or request: abdominal pain/sinus symptoms    Duration (how long have symptoms been present): 1 day    Have you been treated for this before? No    Additional comments: pt states has constant abdominal pain right below her belly button and also has sinus symptoms. Please advise    Phone Number patient can be reached at:  Home number on file [phone] (home)    Best Time:  ANY    Can we leave a detailed message on this number:  YES    Call taken on 7/12/2018 at 7:22 AM by Elizabeth Yun

## 2018-07-16 ENCOUNTER — TELEPHONE (OUTPATIENT)
Dept: FAMILY MEDICINE | Facility: CLINIC | Age: 23
End: 2018-07-16

## 2018-07-16 NOTE — TELEPHONE ENCOUNTER
----- Message from Miquel Crane MD sent at 7/16/2018  1:48 AM CDT -----  Please call this patient with her negative STD tests

## 2018-08-03 ENCOUNTER — TELEPHONE (OUTPATIENT)
Dept: FAMILY MEDICINE | Facility: OTHER | Age: 23
End: 2018-08-03

## 2018-08-03 NOTE — LETTER
[facility]  [address] [facility] [address]  Phone: [phone]  August 3, 2018      Clare Hummel  [address] [facility] [address]      Dear Clare,    We care about your health and have reviewed your health plan including your medical conditions, medications, and lab results.  Based on this review, it is recommended that you follow up regarding the following health topic(s):  -Asthma  -Depression  -Cervical Cancer Screening    We recommend you take the following action(s):  -schedule a PAP SMEAR EXAM which is due.  Please disregard this reminder if you have had this exam elsewhere within the last year.  It would be helpful for us to have a copy of your recent pap smear report to update your records.   -Complete and return the attached ASTHMA CONTROL TEST.  If your total score is 19 or less or you have been to the ER or urgent care for your asthma, then please schedule an asthma followup appointment.  -Complete and return the attached PHQ-9 Form.  If your total score is greater than 9, please schedule a followup appointment.  If you answer Yes to question 9, call your clinic between the hours of 8 to 5.  You may also call the Suicide Hotline at [phone] ([phone]) any time.     Please call us at the [facility] - [phone] (or use [employer]) to address the above recommendations.     Thank you for trusting [facility] and we appreciate the opportunity to serve you.  We look forward to supporting your healthcare needs in the future.    Healthy Regards,    Your Health Care Team  [facility]

## 2018-08-03 NOTE — TELEPHONE ENCOUNTER
Summary:    Patient is due/failing the following:   ACT, PAP and PHQ9    Action needed:   Patient needs office visit for PAP., Patient needs to do ACT. and Patient needs to do PHQ9.    Type of outreach:    Sent letter.    Questions for provider review:    None                                                                                                                                    Zoraida Self       Chart routed to Care Team .        Panel Management Review      Patient has the following on her problem list:     Depression / Dysthymia review    Measure:  Needs PHQ-9 score of 4 or less during index window.  Administer PHQ-9 and if score is 5 or more, send encounter to provider for next steps.        PHQ-9 SCORE 4/21/2016 7/11/2017 6/29/2018   Total Score - - -   Total Score 18 3 7       If PHQ-9 recheck is 5 or more, route to provider for next steps.    Patient is due for:  PHQ9    Asthma review     ACT Total Scores 4/21/2016   ACT TOTAL SCORE (Goal Greater than or Equal to 20) 22   In the past 12 months, how many times did you visit the emergency room for your asthma without being admitted to the hospital? 0   In the past 12 months, how many times were you hospitalized overnight because of your asthma? 0      1. Is Asthma diagnosis on the Problem List? Yes    2. Is Asthma listed on Health Maintenance? Yes    3. Patient is due for:  ACT      Composite cancer screening  Chart review shows that this patient is due/due soon for the following Pap Smear

## 2018-10-08 ENCOUNTER — TELEPHONE (OUTPATIENT)
Dept: FAMILY MEDICINE | Facility: CLINIC | Age: 23
End: 2018-10-08

## 2018-10-08 DIAGNOSIS — F32.1 MAJOR DEPRESSIVE DISORDER, SINGLE EPISODE, MODERATE (H): Primary | ICD-10-CM

## 2018-10-08 NOTE — TELEPHONE ENCOUNTER
Panel Management Review      Patient has the following on her problem list:     Depression / Dysthymia review    Measure:  Needs PHQ-9 score of 4 or less during index window.  Administer PHQ-9 and if score is 5 or more, send encounter to provider for next steps.    5 - 7 month window range:     PHQ-9 SCORE 4/21/2016 7/11/2017 6/29/2018   Total Score - - -   Total Score 18 3 7       If PHQ-9 recheck is 5 or more, route to provider for next steps.    Patient is due for:  DAP      Composite cancer screening  Chart review shows that this patient is due/due soon for the following Pap Smear  Summary:    Patient is due/failing the following:   AAP, ACT, DAP and PAP    Action needed:   Patient needs office visit for pap. and Patient needs to do ACT.    Type of outreach:    Phone, spoke to patient.  she is living in [place] and will be getting care up there. I will mail act, aap and dap and call her back in 1-2 weeks for act results. She will call us with pap info after she gets that done    Questions for provider review:    None                                                                                                                                    Karmen Adams, [facility]       Chart routed to myself to follow up .

## 2018-10-08 NOTE — LETTER
My Asthma Action Plan  Name: Clare Hummel   YOB: 1995  Date: 10/8/2018   My doctor: Miquel Crane MD, MD   My clinic: [facility]        My Control Medicine: None  My Rescue Medicine: Albuterol (Proair/Ventolin/Proventil) inhaler use 2 puffs every 4 hours as needed   My Asthma Severity:   Avoid your asthma triggers:                GREEN ZONE   Good Control    I feel good    No cough or wheeze    Can work, sleep and play without asthma symptoms       Take your asthma control medicine every day.     1. If exercise triggers your asthma, take your rescue medication    15 minutes before exercise or sports, and    During exercise if you have asthma symptoms  2. Spacer to use with inhaler: If you have a spacer, make sure to use it with your inhaler             YELLOW ZONE Getting Worse  I have ANY of these:    I do not feel good    Cough or wheeze    Chest feels tight    Wake up at night   1. Keep taking your Green Zone medications  2. Start taking your rescue medicine:    every 20 minutes for up to 1 hour. Then every 4 hours for 24-48 hours.  3. If you stay in the Yellow Zone for more than 12-24 hours, contact your doctor.  4. If you do not return to the Green Zone in 12-24 hours or you get worse, start taking your oral steroid medicine if prescribed by your provider.           RED ZONE Medical Alert - Get Help  I have ANY of these:    I feel awful    Medicine is not helping    Breathing getting harder    Trouble walking or talking    Nose opens wide to breathe       1. Take your rescue medicine NOW  2. If your provider has prescribed an oral steroid medicine, start taking it NOW  3. Call your doctor NOW  4. If you are still in the Red Zone after 20 minutes and you have not reached your doctor:    Take your rescue medicine again and    Call 911 or go to the emergency room right away    See your regular doctor within 2 weeks of an Emergency Room or Urgent Care visit for follow-up  treatment.          Annual Reminders:  Meet with Asthma Educator,  Flu Shot in the Fall, consider Pneumonia Vaccination for patients with asthma (aged 19 and older).    Pharmacy: [facility] [address] [facility]                      Asthma Triggers  How To Control Things That Make Your Asthma Worse    Triggers are things that make your asthma worse.  Look at the list below to help you find your triggers and what you can do about them.  You can help prevent asthma flare-ups by staying away from your triggers.      Trigger                                                          What you can do   Cigarette Smoke  Tobacco smoke can make asthma worse. Do not allow smoking in your home, car or around you.  Be sure no one smokes at a child s day care or school.  If you smoke, ask your health care provider for ways to help you quit.  Ask family members to quit too.  Ask your health care provider for a referral to Quit Plan to help you quit smoking, or call 8-948-349-PLAN.     Colds, Flu, Bronchitis  These are common triggers of asthma. Wash your hands often.  Don t touch your eyes, nose or mouth.  Get a flu shot every year.     Dust Mites  These are tiny bugs that live in cloth or carpet. They are too small to see. Wash sheets and blankets in hot water every week.   Encase pillows and mattress in dust mite proof covers.  Avoid having carpet if you can. If you have carpet, vacuum weekly.   Use a dust mask and HEPA vacuum.   Pollen and Outdoor Mold  Some people are allergic to trees, grass, or weed pollen, or molds. Try to keep your windows closed.  Limit time out doors when pollen count is high.   Ask you health care provider about taking medicine during allergy season.     Animal Dander  Some people are allergic to skin flakes, urine or saliva from pets with fur or feathers. Keep pets with fur or feathers out of your home.    If you can t keep the pet outdoors, then keep the pet out of your  bedroom.  Keep the bedroom door closed.  Keep pets off cloth furniture and away from stuffed toys.     Mice, Rats, and Cockroaches  Some people are allergic to the waste from these pests.   Cover food and garbage.  Clean up spills and food crumbs.  Store grease in the refrigerator.   Keep food out of the bedroom.   Indoor Mold  This can be a trigger if your home has high moisture. Fix leaking faucets, pipes, or other sources of water.   Clean moldy surfaces.  Dehumidify basement if it is damp and smelly.   Smoke, Strong Odors, and Sprays  These can reduce air quality. Stay away from strong odors and sprays, such as perfume, powder, hair spray, paints, smoke incense, paint, cleaning products, candles and new carpet.   Exercise or Sports  Some people with asthma have this trigger. Be active!  Ask your doctor about taking medicine before sports or exercise to prevent symptoms.    Warm up for 5-10 minutes before and after sports or exercise.     Other Triggers of Asthma  Cold air:  Cover your nose and mouth with a scarf.  Sometimes laughing or crying can be a trigger.  Some medicines and food can trigger asthma.

## 2018-10-08 NOTE — LETTER
[address] [address]  [phone]        October 8, 2018    Clare Hummel  [address] [place] [address]          Dear Clare,    I spoke to you on 10/08/18 regarding being overdue for a pap and asthma control test. Please call me when you have your pap done and I can obtain the information from you to enter in your chart so you dont continue to fall on my list. Enclosed is the asthma control test. Please fill this out and you can either mail it back or call me and I will enter the results. I will give you a call in 1-2 weeks. The enclosed asthma action plan is just for your review.      Sincerely,        Karmen Adams, [facility]

## 2018-10-15 NOTE — TELEPHONE ENCOUNTER
Phone, spoke to patient.  She had the ACT in front of her and we did over the phone. She scored a 12. She states her asthma is not very well controlled anymore. I offered her an appt with Crane to see if she needs another medication. She is going to check her schedule and call us back.   Karmen Adams, [facility]

## 2018-10-16 ASSESSMENT — ASTHMA QUESTIONNAIRES: ACT_TOTALSCORE: 12

## 2018-12-05 ENCOUNTER — TELEPHONE (OUTPATIENT)
Dept: FAMILY MEDICINE | Facility: CLINIC | Age: 23
End: 2018-12-05

## 2018-12-05 NOTE — TELEPHONE ENCOUNTER
Panel Management Review      Patient has the following on her problem list:     Depression / Dysthymia review    Measure:  Needs PHQ-9 score of 4 or less during index window.  Administer PHQ-9 and if score is 5 or more, send encounter to provider for next steps.    5 - 7 month window range:     PHQ-9 SCORE 4/21/2016 7/11/2017 6/29/2018   PHQ-9 Total Score - - -   PHQ-9 Total Score 18 3 7       If PHQ-9 recheck is 5 or more, route to provider for next steps.    Patient is due for:  PHQ9       ADHD (ages 6-12)  Review Medication Prescription dates:              Is this the first RX date?   NO - When was the previous RX date?  She is not currently on meds for this  (if more than 120 days then a 30 day follow up is still needed)  Review Quality Dashboard or scorecard for index dates for patient.         Composite cancer screening  Chart review shows that this patient is due/due soon for the following None  Summary:    Patient is due/failing the following:   ACT and PHQ9    Action needed:   Patient needs to do ACT. and Patient needs to do PHQ9.    Type of outreach:    Sent letter. and called pt but no answer and no voicemail set up    Questions for provider review:    None                                                                                                                                    Karmen Adams, [facility]       Chart routed to myself to follow up .

## 2018-12-05 NOTE — LETTER
[address] [address]  [phone]        December 5, 2018    Clare Hummel  [address] [place] [address]          Dear Clare,    Our records indicate your due for the asthma control test. We periodically need to update these in your chart and for your continued wellness we want to make sure your asthma is well controlled with the current medication you are taking. Please take a few minutes to fill this form out and you can either send it back to us or call us and we will enter the numbers in your chart.     I also am showing you due for the depression screening form. When completing this form please answer your questions solely regarding depression. I have also enclosed this. Please take a few minutes to fill these both out and return to us in the postage paid envelope I provided.     Thank you!    Sincerely,        Karmen Adams [facility]

## 2018-12-17 NOTE — TELEPHONE ENCOUNTER
No answer and no voicemail. Will try back at a later date.   Karmen Adams, [facility]          I have attempted to contact this patient by phone with the following results: no answer, no voicemail.    '

## 2019-01-01 NOTE — LETTER
[facility]  [address] [address]  Phone: [phone]    November 8, 2017        Clare Hummel  [address] [place] [id number]          To whom it may concern:    RE: Clare Hummel    Patient was seen and treated today at our clinic.    Please contact me for questions or concerns.      Sincerely,        Laina Houston MD  
[facility]  [address] [facility] [id number] [phone]        November 10, 2017    Clare Hummel  [address] [place] [address]            Dear Ms. Hummel,     Your tests for gonorrhea and chlamydia were negative.       Please contact the clinic if you have additional questions.  Thank you.     Sincerely,     Laina Houston M.D/ks    Results for orders placed or performed in visit on 11/08/17   NEISSERIA GONORRHOEA PCR   Result Value Ref Range    Specimen Descrip Urine     N Gonorrhea PCR Negative NEG^Negative   CHLAMYDIA TRACHOMATIS PCR   Result Value Ref Range    Specimen Description Urine     Chlamydia Trachomatis PCR Negative NEG^Negative                       
[id number]

## 2019-01-21 ENCOUNTER — ALLIED HEALTH/NURSE VISIT (OUTPATIENT)
Dept: FAMILY MEDICINE | Facility: OTHER | Age: 24
End: 2019-01-21

## 2019-01-21 DIAGNOSIS — Z32.00 PREGNANCY EXAMINATION OR TEST, PREGNANCY UNCONFIRMED: Primary | ICD-10-CM

## 2019-01-21 LAB — BETA HCG QUAL IFA URINE: POSITIVE

## 2019-01-21 PROCEDURE — 99207 ZZC NO CHARGE NURSE ONLY: CPT

## 2019-01-21 PROCEDURE — 84703 CHORIONIC GONADOTROPIN ASSAY: CPT | Performed by: FAMILY MEDICINE

## 2019-01-21 NOTE — PROGRESS NOTES
Clare Hmumel is a 23 year old here today for a pregnancy test.  LMP: Patient's last menstrual period was 2018 (exact date).  Wt: 0 lbs 0 oz.    Symptoms include breast tenderness, absence of menses, nausea &/or vomiting and fatigue.    Clare informed of positive pregnancy test results. KARON: 2019    Educational advice given: nutrition, smoking and drugs & alcohol.    Current medications reviewed: Yes    Previous pregnancy history remarkable for: 1st pregnancy.    Plan: schedule appointment with OB Educator and/or OB class, follow-up appointment with Dr. Moreira Of choice for pre- care, take multivitamin or pre- vitamins and OB Education packet given.    She is to call back if she has any questions or concerns.  She is advised to notify a provider immediately if she experiences any severe cramping or abdominal pain or any vaginal bleeding.

## 2019-01-21 NOTE — LETTER
[facility] Site: [facility]   [phone] (Fax)    Positive Pregnancy Test Confirmation     Date: 1/21/2019    Name: Clare Hummel  [address] [place] [address]  [phone] (home)   YOB: 1995    Patient's last menstrual period was 12/08/2018 (exact date).    Expected Date of Delivery: Sep 14, 2019    Date of Positive Pregnancy Test: 01/21/2019          Provider Signature:                                                            Dr. Faisal Moreira

## 2019-01-21 NOTE — PATIENT INSTRUCTIONS
"According to your last menstrual period, you are approximately 6 weeks and 2 days pregnant.  Your estimated due date is 09/14/2019.    Suggestions:  [facility] [place] health insurance    To do list:  1. If you have not already started taking a prenatal vitamin, please start today.  2. Visit with our OB Intake nurse, Patricia: this can be scheduled any time between today and the first visit with your provider. We do require that you see OB intake before your \"first OB\" visit if you choose to deliver at [facility].          Mondays- [place] (11am-6pm)       Tuesdays- [place] (9am-11am)/ [place] (1pm-3pm)       Wednesdays- [place] (9am-2pm)       Thursdays- [place] (8am-10am)    3. The first OB visit with Dr. Moreira is to be scheduled between 10 and 12 weeks:.  To make these appointments, or if you have any questions and would like to speak to your care team, you can call the clinic's main phone number:       [facility]: [phone]       [facility]: [phone]       [facility]: [phone]       [facility]: [phone]       [facility]: [phone]    Please remember, we would like to hear from you if you have any vaginal bleeding or any moderate to severe abdominal pain/cramping. You can call any of the phone numbers above and speak with an RN promptly, even if it is after clinic hours, someone will answer your call. You can also call the numbers listed in your take home folder from today's visit.     Thank you for choosing [facility], and Congratulations!    Дмитрий Freeman, RN, BSN            "

## 2019-01-22 ENCOUNTER — PRENATAL OFFICE VISIT (OUTPATIENT)
Dept: FAMILY MEDICINE | Facility: CLINIC | Age: 24
End: 2019-01-22

## 2019-01-22 VITALS — BODY MASS INDEX: 36.26 KG/M2 | WEIGHT: 231 LBS | HEIGHT: 67 IN

## 2019-01-22 DIAGNOSIS — Z34.00 SUPERVISION OF NORMAL FIRST PREGNANCY: Primary | ICD-10-CM

## 2019-01-22 PROBLEM — Z29.13 NEED FOR RHOGAM DUE TO RH NEGATIVE MOTHER: Status: ACTIVE | Noted: 2019-01-22

## 2019-01-22 LAB
ABO + RH BLD: NORMAL
ABO + RH BLD: NORMAL
BLD GP AB SCN SERPL QL: NORMAL
BLOOD BANK CMNT PATIENT-IMP: NORMAL
ERYTHROCYTE [DISTWIDTH] IN BLOOD BY AUTOMATED COUNT: 12.7 % (ref 10–15)
HBV SURFACE AG SERPL QL IA: NONREACTIVE
HCT VFR BLD AUTO: 36.1 % (ref 35–47)
HGB BLD-MCNC: 12.3 G/DL (ref 11.7–15.7)
HIV 1+2 AB+HIV1 P24 AG SERPL QL IA: NONREACTIVE
MCH RBC QN AUTO: 30.4 PG (ref 26.5–33)
MCHC RBC AUTO-ENTMCNC: 34.1 G/DL (ref 31.5–36.5)
MCV RBC AUTO: 89 FL (ref 78–100)
PLATELET # BLD AUTO: 167 10E9/L (ref 150–450)
RBC # BLD AUTO: 4.05 10E12/L (ref 3.8–5.2)
SPECIMEN EXP DATE BLD: NORMAL
WBC # BLD AUTO: 5.2 10E9/L (ref 4–11)

## 2019-01-22 PROCEDURE — 85027 COMPLETE CBC AUTOMATED: CPT | Performed by: FAMILY MEDICINE

## 2019-01-22 PROCEDURE — 87389 HIV-1 AG W/HIV-1&-2 AB AG IA: CPT | Performed by: FAMILY MEDICINE

## 2019-01-22 PROCEDURE — 86900 BLOOD TYPING SEROLOGIC ABO: CPT | Performed by: FAMILY MEDICINE

## 2019-01-22 PROCEDURE — 87340 HEPATITIS B SURFACE AG IA: CPT | Performed by: FAMILY MEDICINE

## 2019-01-22 PROCEDURE — 86901 BLOOD TYPING SEROLOGIC RH(D): CPT | Performed by: FAMILY MEDICINE

## 2019-01-22 PROCEDURE — 86850 RBC ANTIBODY SCREEN: CPT | Performed by: FAMILY MEDICINE

## 2019-01-22 PROCEDURE — 36415 COLL VENOUS BLD VENIPUNCTURE: CPT | Performed by: FAMILY MEDICINE

## 2019-01-22 PROCEDURE — 86780 TREPONEMA PALLIDUM: CPT | Performed by: FAMILY MEDICINE

## 2019-01-22 PROCEDURE — 87086 URINE CULTURE/COLONY COUNT: CPT | Performed by: FAMILY MEDICINE

## 2019-01-22 PROCEDURE — 99207 ZZC NO CHARGE NURSE ONLY: CPT

## 2019-01-22 PROCEDURE — 86762 RUBELLA ANTIBODY: CPT | Performed by: FAMILY MEDICINE

## 2019-01-22 RX ORDER — PRENATAL VIT/IRON FUM/FOLIC AC 27MG-0.8MG
1 TABLET ORAL DAILY
COMMUNITY

## 2019-01-22 ASSESSMENT — MIFFLIN-ST. JEOR: SCORE: 1840.44

## 2019-01-23 LAB
BACTERIA SPEC CULT: NORMAL
BACTERIA SPEC CULT: NORMAL
Lab: NORMAL
RUBV IGG SERPL IA-ACNC: 96 IU/ML
SPECIMEN SOURCE: NORMAL
T PALLIDUM AB SER QL: NONREACTIVE

## 2019-02-04 ENCOUNTER — HOSPITAL ENCOUNTER (OUTPATIENT)
Dept: ULTRASOUND IMAGING | Facility: CLINIC | Age: 24
Discharge: HOME | End: 2019-02-04
Attending: FAMILY MEDICINE | Admitting: FAMILY MEDICINE

## 2019-02-04 DIAGNOSIS — Z34.00 SUPERVISION OF NORMAL FIRST PREGNANCY: ICD-10-CM

## 2019-02-04 PROCEDURE — 76801 OB US < 14 WKS SINGLE FETUS: CPT

## 2019-03-01 ENCOUNTER — TELEPHONE (OUTPATIENT)
Dept: FAMILY MEDICINE | Facility: CLINIC | Age: 24
End: 2019-03-01

## 2019-03-01 NOTE — TELEPHONE ENCOUNTER
Reason for Call:  Other appointment    Detailed comments: patient calling needs to change her appointment on 3/05/19, as she is starting a new job. Patient would like to be seen in 2 weeks. Provider has no appointments available. Please call and let patient know when she can be seen at #[phone] patient is aware that provider is out of the office today.    Phone Number Patient can be reached at: Other phone number:  [phone]    Best Time: any    Can we leave a detailed message on this number? YES    Call taken on 3/1/2019 at 10:33 AM by Louann Hopkins

## 2019-03-05 NOTE — TELEPHONE ENCOUNTER
Called patient and left message to call the clinic back.  MJP/MA       will be out of clinic in two weeks. We can schedule her the week he is back  MP/MA

## 2019-04-02 ENCOUNTER — PRENATAL OFFICE VISIT (OUTPATIENT)
Dept: FAMILY MEDICINE | Facility: CLINIC | Age: 24
End: 2019-04-02

## 2019-04-02 VITALS
SYSTOLIC BLOOD PRESSURE: 120 MMHG | RESPIRATION RATE: 18 BRPM | DIASTOLIC BLOOD PRESSURE: 82 MMHG | TEMPERATURE: 97.7 F | WEIGHT: 234 LBS | HEART RATE: 68 BPM | OXYGEN SATURATION: 96 % | BODY MASS INDEX: 36.65 KG/M2

## 2019-04-02 DIAGNOSIS — Z34.02 ENCOUNTER FOR SUPERVISION OF NORMAL FIRST PREGNANCY IN SECOND TRIMESTER: Primary | ICD-10-CM

## 2019-04-02 DIAGNOSIS — F41.1 GAD (GENERALIZED ANXIETY DISORDER): ICD-10-CM

## 2019-04-02 PROBLEM — Z34.00 SUPERVISION OF NORMAL FIRST PREGNANCY: Status: ACTIVE | Noted: 2019-04-02

## 2019-04-02 LAB
SPECIMEN SOURCE: NORMAL
WET PREP SPEC: NORMAL

## 2019-04-02 PROCEDURE — 87591 N.GONORRHOEAE DNA AMP PROB: CPT | Performed by: FAMILY MEDICINE

## 2019-04-02 PROCEDURE — 36415 COLL VENOUS BLD VENIPUNCTURE: CPT | Performed by: FAMILY MEDICINE

## 2019-04-02 PROCEDURE — G0145 SCR C/V CYTO,THINLAYER,RESCR: HCPCS | Performed by: FAMILY MEDICINE

## 2019-04-02 PROCEDURE — 81511 FTL CGEN ABNOR FOUR ANAL: CPT | Mod: 90 | Performed by: FAMILY MEDICINE

## 2019-04-02 PROCEDURE — 99000 SPECIMEN HANDLING OFFICE-LAB: CPT | Performed by: FAMILY MEDICINE

## 2019-04-02 PROCEDURE — 87491 CHLMYD TRACH DNA AMP PROBE: CPT | Performed by: FAMILY MEDICINE

## 2019-04-02 PROCEDURE — 87210 SMEAR WET MOUNT SALINE/INK: CPT | Performed by: FAMILY MEDICINE

## 2019-04-02 PROCEDURE — 99207 ZZC FIRST OB VISIT: CPT | Performed by: FAMILY MEDICINE

## 2019-04-02 ASSESSMENT — PATIENT HEALTH QUESTIONNAIRE - PHQ9: SUM OF ALL RESPONSES TO PHQ QUESTIONS 1-9: 6

## 2019-04-02 ASSESSMENT — PAIN SCALES - GENERAL: PAINLEVEL: NO PAIN (0)

## 2019-04-02 NOTE — PROGRESS NOTES
"INITIAL OB ASSESSMENT                     Date: 2019  Age: 23 year old   Plan Number: [id number]  Name: Clare Hummel    : 1995  Phone: [phone] (home)  Marital Status:,    Race/Ethnicity:    PC Provider:  No Ref-Primary, Physician    Protestant: Hindu  OB Physician:    Partner's Name: Scahin    Partner's Occupation:  [employer]    OB CNM/NP/PA:         See Specialty History for details.    LMP: 2018 , Cycle length: 2 days  LMP Certain?:Yes  LMP Normal?: No     Allergies   Allergen Reactions     Benadryl Allergy Itching     Penicillins      rash     Sulfa Drugs      rash        Current Outpatient Medications   Medication Sig Dispense Refill     albuterol (PROAIR HFA/PROVENTIL HFA/VENTOLIN HFA) 108 (90 Base) MCG/ACT Inhaler Inhale 2 puffs into the lungs every 4 hours as needed for shortness of breath / dyspnea or wheezing 1 Inhaler 0     Prenatal Vit-Fe Fumarate-FA (PRENATAL MULTIVITAMIN W/IRON) 27-0.8 MG tablet Take 1 tablet by mouth daily       Social History: Benign and No substance abuse, environmental exposures, mental health risks     Past Medical History of Father of Baby:   No significant medical history     Past Medical History of Patient:  Past Medical History:   Diagnosis Date     ADD (attention deficit disorder) 2010     Asthma      Rh(-), will need rhogam at 28 wks gestation 2019     Surgical History:       Past Surgical History:   Procedure Laterality Date     NO HISTORY OF SURGERY       Family History:   Family History   Problem Relation Age of Onset     Cerebrovascular Disease Maternal Grandmother      Dementia Maternal Grandmother      Depression Mother      No Known Problems Father      Cerebrovascular Disease Sister         \"while she was pregnant - she's doing well/takes baby asa\"     Heart Defect Sister         \"found while she was pregnant and resulted in stroke\"     No Known Problems Brother      No Known Problems Maternal " Grandfather      No Known Problems Paternal Grandmother      No Known Problems Paternal Grandfather      No Known Problems Sister      Diabetes No family hx of      Breast Cancer No family hx of      Other Cancer No family hx of      Substance Abuse No family hx of      Osteoporosis No family hx of      Hypertension No family hx of      Genetic History: No known genectic disease in 1st or 2nd degree relatives and No known genectic disease in FOB's 1st ot 2nd degree relatives     Review of Systems: General: Benign and Pre-pregnancy weight 231 lb;  Cardiovascular:Benign,    Respiratory: Benign;  Gastrointestinal Nausea;  Genitourinary: Benign;  Neuromuscular: Benign;   Endocrine: Benign;  Dermatologic Benign;   Psychiatric: anxiety has worsened quite a bit.     History Since Last Menstrual Period: nausea and anxiety      Physical Exam: General: Well developed, well nourished female and Obese;   Skin: Normal;   HEENT: PERRLA, EOMI, fundi benign;   Neck: Supple, no adenopathy and thyroid normal;   Chest: Clear to auscultation;   Heart: Regular rate, rhythm and No murmur, rub, gallop;  Breasts: Not examined;   Abdomen: Benign, Soft, flat, non-tender, No masses, organomegaly, No inguinal nodes, Bowel sounds normoactive and FH is 2/3 to the umbilicus;   Extremities: Normal and No edema;  Neurological: Normal;   Perineum: Intact;   Vulva: Normal genitalia;  Vagina: Normal mucosa, no discharge; Clue cells none seen;   Cervix: Nulliparous, closed, mobile,  no discharge, Length 5 cm;  Uterus: 16 weeks, Normal shape, position and consistency, Anteflexed and Nontender;   Adnexa: No masses, nodularity, tenderness;  Rectum: NE;   Bony Pelvis: Adequate and Gynecoid.   Pelvimetry: Diagonal Conjugate: 10 cm, Pubic angle >90 degrees, Ischial spines flat and Interspinal distance:  9 cm    Assessment:   IUP at 16 wks gestation  Generalized anxiety     Plan:  Follow up in 4 weeks.  Normal exercise.  Normal sexual activity.  Prenatal  vitamins.  Anticipated weight gain.  She will need her 20-week ultrasound at her next visit.    I did offer to get her started on an antianxiety medication at a low dose.  She did not give me her final answer on that so I will contact her again this week and see if she is willing to start something since her anxiety seems to be causing some issues with panic.  I told her we could started on 10 mg of fluoxetine or 25 mg of sertraline both which would work well during pregnancy.  She has been on sertraline in the past and that has worked for her so I think this is a reasonable option for her.    Electronically signed by:  Faisal Moreira M.D.  4/2/2019

## 2019-04-02 NOTE — LETTER
April 8, 2019      Clare Hummel  [address] [facility] [address]    Dear ,      I am happy to inform you that your recent cervical cancer screening test (PAP smear) was normal.      Preventative screenings such as this help to ensure your health for years to come. You should repeat a pap smear in 3 years, unless otherwise directed.      You will still need to return to the clinic every year for your annual exam and other preventive tests.     If you have additional questions regarding this result, please call our registered nurse, Krista at [phone].      Sincerely,      Faisal Moreira MD/[facility]

## 2019-04-03 RX ORDER — SERTRALINE HYDROCHLORIDE 25 MG/1
25 TABLET, FILM COATED ORAL DAILY
Qty: 90 TABLET | Refills: 3 | Status: SHIPPED | OUTPATIENT
Start: 2019-04-03 | End: 2019-06-12

## 2019-04-04 LAB
COPATH REPORT: NORMAL
PAP: NORMAL

## 2019-04-05 LAB
# FETUSES US: NORMAL
# FETUSES: 1
AFP ADJ MOM AMN: 1.18
AFP SERPL-MCNC: 31 NG/ML
AGE - REPORTED: 22.9 YR
CURRENT SMOKER: YES
CURRENT SMOKER: YES
DIABETES STATUS PATIENT: NO
FAMILY MEMBER DISEASES HX: NO
FAMILY MEMBER DISEASES HX: NO
GA METHOD: NORMAL
GA METHOD: NORMAL
GA: NORMAL WK
HCG MOM SERPL: 0.76
HCG SERPL-ACNC: NORMAL IU/L
HX OF HEREDITARY DISORDERS: NO
IDDM PATIENT QL: NO
INHIBIN A MOM SERPL: 0.84
INHIBIN A SERPL-MCNC: 160 PG/ML
INTEGRATED SCN PATIENT-IMP: NORMAL
IVF PREGNANCY: NO
LMP START DATE: NORMAL
MONOCHORIONIC TWINS: NO
PATHOLOGY STUDY: NORMAL
PREV FETUS DEFECT: NO
SERVICE CMNT-IMP: NO
SPECIMEN DRAWN SERPL: NORMAL
U ESTRIOL MOM SERPL: 0.66
U ESTRIOL SERPL-MCNC: 0.52 NG/ML
VALPROIC/CARBAMAZEPINE STATUS: NO
WEIGHT UNITS: NORMAL

## 2019-04-09 ENCOUNTER — TELEPHONE (OUTPATIENT)
Dept: FAMILY MEDICINE | Facility: CLINIC | Age: 24
End: 2019-04-09

## 2019-04-09 NOTE — TELEPHONE ENCOUNTER
----- Message from Faisal Moreira MD sent at 4/8/2019  7:55 PM CDT -----  Please call patient with following results.  Let Tammy know that her quad screen was completely normal.  She is NOT at increased risk for Down syndrome, trisomy 21 or neural tube defects.    Electronically signed by:  Faisal Moreira M.D.  4/8/2019

## 2019-05-07 ENCOUNTER — HOSPITAL ENCOUNTER (OUTPATIENT)
Dept: ULTRASOUND IMAGING | Facility: CLINIC | Age: 24
Discharge: HOME | End: 2019-05-07
Attending: FAMILY MEDICINE | Admitting: FAMILY MEDICINE
Payer: MEDICAID

## 2019-05-07 ENCOUNTER — PRENATAL OFFICE VISIT (OUTPATIENT)
Dept: FAMILY MEDICINE | Facility: CLINIC | Age: 24
End: 2019-05-07

## 2019-05-07 VITALS
SYSTOLIC BLOOD PRESSURE: 124 MMHG | RESPIRATION RATE: 16 BRPM | OXYGEN SATURATION: 98 % | HEART RATE: 80 BPM | TEMPERATURE: 97.9 F | BODY MASS INDEX: 36.18 KG/M2 | WEIGHT: 231 LBS | DIASTOLIC BLOOD PRESSURE: 82 MMHG

## 2019-05-07 DIAGNOSIS — Z34.02 ENCOUNTER FOR SUPERVISION OF NORMAL FIRST PREGNANCY IN SECOND TRIMESTER: ICD-10-CM

## 2019-05-07 DIAGNOSIS — Z34.02 ENCOUNTER FOR SUPERVISION OF NORMAL FIRST PREGNANCY IN SECOND TRIMESTER: Primary | ICD-10-CM

## 2019-05-07 PROCEDURE — 99207 ZZC PRENATAL VISIT: CPT | Performed by: FAMILY MEDICINE

## 2019-05-07 PROCEDURE — 76805 OB US >/= 14 WKS SNGL FETUS: CPT

## 2019-05-07 ASSESSMENT — PAIN SCALES - GENERAL: PAINLEVEL: NO PAIN (0)

## 2019-05-09 ENCOUNTER — TELEPHONE (OUTPATIENT)
Dept: FAMILY MEDICINE | Facility: CLINIC | Age: 24
End: 2019-05-09

## 2019-05-09 DIAGNOSIS — Z34.03 ENCOUNTER FOR SUPERVISION OF NORMAL FIRST PREGNANCY IN THIRD TRIMESTER: Primary | ICD-10-CM

## 2019-05-09 NOTE — TELEPHONE ENCOUNTER
----- Message from Faisal Moreira MD sent at 5/8/2019  8:34 PM CDT -----  Please contact the patient with a follow-up ultrasound date in 2 to 3 weeks.  They could not see some of the anatomy on her 20-week ultrasound.  I notified her via result notes that someone will be contacting her to set this ultrasound.    Electronically signed by:  Faisal Moreira M.D.  5/8/2019

## 2019-05-09 NOTE — TELEPHONE ENCOUNTER
Tuesday 05/28/19 @ 1:30pm for a repeat ultrasound.   Erika VENCES     Karmasphere message sent to patient with date and time of repeat Ultrasound   Erika VENCES

## 2019-05-15 ENCOUNTER — ANCILLARY PROCEDURE (OUTPATIENT)
Dept: ULTRASOUND IMAGING | Facility: CLINIC | Age: 24
End: 2019-05-15
Attending: FAMILY MEDICINE
Payer: MEDICAID

## 2019-05-15 DIAGNOSIS — Z34.03 ENCOUNTER FOR SUPERVISION OF NORMAL FIRST PREGNANCY IN THIRD TRIMESTER: ICD-10-CM

## 2019-05-15 PROCEDURE — 76816 OB US FOLLOW-UP PER FETUS: CPT

## 2019-06-06 ENCOUNTER — TELEPHONE (OUTPATIENT)
Dept: FAMILY MEDICINE | Facility: CLINIC | Age: 24
End: 2019-06-06

## 2019-06-06 ENCOUNTER — NURSE TRIAGE (OUTPATIENT)
Dept: NURSING | Facility: CLINIC | Age: 24
End: 2019-06-06

## 2019-06-07 NOTE — TELEPHONE ENCOUNTER
"26 wks, Dr Moreira,  Pt c/o increased vaginal pressure today. No vaginal bleeding or leakage of fluid. Baby moving well today. No contractions. Advised pt she may need to be seen tonight at L&D but first will have her talk w/ nurse at [facility] L[facility]. Warm transferred pt to RENNY Murphy @ L&D    Reason for Disposition    Increased pressure in pelvic area    Additional Information    Negative: Passed out (i.e., lost consciousness, collapsed and was not responding)    Negative: Shock suspected (e.g., cold/pale/clammy skin, too weak to stand, low BP, rapid pulse)    Negative: Difficult to awaken or acting confused (e.g., disoriented, slurred speech)    Negative: [1] SEVERE abdominal pain (e.g., excruciating) AND [2] constant AND [3] present > 1 hour    Negative: Severe bleeding (e.g., continuous red blood from vagina, or large blood clots)    Negative: Umbilical cord hanging out of the vagina (shiny, white, curled appearance, \"like telephone cord\")    Negative: Uncontrollable urge to push (i.e., feels like baby is coming out now)    Negative: Can see baby    Negative: Sounds like a life-threatening emergency to the triager    Negative: MODERATE-SEVERE abdominal pain    Negative: Contractions < 10 minutes apart for 1 hour (i.e., 6 or more contractions an hour)    Negative: [1] Contractions > 10 minutes apart AND [2] persist > 24 hours AND [3] no improvement using CARE ADVICE    Negative: [1] Contractions AND [2] any vaginal bleeding (including: red blood, clots, spotting, or pink/brown mucous)    Negative: Vaginal bleeding  (Exception: brief spotting after intercourse or pelvic exam, or slight pinkish or brownish mucous discharge)    Negative: Leakage of fluid from vagina    Negative: [1] Baby moving less today (e.g., kick count < 5 in 1 hour or < 10 in 2 hours) AND [2] pregnant 23 or more weeks    Negative: No movement of baby for 8 hours    Negative: Severe headache or headache that won't go away    " Negative: New blurred vision or vision changes    Negative: Fever > 100.4 F (38.0 C)    Protocols used: PREGNANCY - LABOR - [id number]

## 2019-06-11 ENCOUNTER — TELEPHONE (OUTPATIENT)
Dept: FAMILY MEDICINE | Facility: CLINIC | Age: 24
End: 2019-06-11

## 2019-06-12 ENCOUNTER — PRENATAL OFFICE VISIT (OUTPATIENT)
Dept: FAMILY MEDICINE | Facility: CLINIC | Age: 24
End: 2019-06-12
Payer: MEDICAID

## 2019-06-12 VITALS
SYSTOLIC BLOOD PRESSURE: 126 MMHG | TEMPERATURE: 97.1 F | HEART RATE: 88 BPM | RESPIRATION RATE: 16 BRPM | DIASTOLIC BLOOD PRESSURE: 78 MMHG | BODY MASS INDEX: 37.75 KG/M2 | OXYGEN SATURATION: 99 % | WEIGHT: 241 LBS

## 2019-06-12 DIAGNOSIS — O26.893 HEARTBURN DURING PREGNANCY, ANTEPARTUM, THIRD TRIMESTER: ICD-10-CM

## 2019-06-12 DIAGNOSIS — Z23 NEED FOR VACCINATION: ICD-10-CM

## 2019-06-12 DIAGNOSIS — Z29.13 NEED FOR RHOGAM DUE TO RH NEGATIVE MOTHER: ICD-10-CM

## 2019-06-12 DIAGNOSIS — Z34.03 ENCOUNTER FOR SUPERVISION OF NORMAL FIRST PREGNANCY IN THIRD TRIMESTER: Primary | ICD-10-CM

## 2019-06-12 DIAGNOSIS — R12 HEARTBURN DURING PREGNANCY, ANTEPARTUM, THIRD TRIMESTER: ICD-10-CM

## 2019-06-12 LAB
GLUCOSE 1H P 50 G GLC PO SERPL-MCNC: 82 MG/DL (ref 60–129)
HGB BLD-MCNC: 11.3 G/DL (ref 11.7–15.7)

## 2019-06-12 PROCEDURE — 00000218 ZZHCL STATISTIC OBHBG - HEMOGLOBIN: Performed by: FAMILY MEDICINE

## 2019-06-12 PROCEDURE — 36415 COLL VENOUS BLD VENIPUNCTURE: CPT | Performed by: FAMILY MEDICINE

## 2019-06-12 PROCEDURE — 82950 GLUCOSE TEST: CPT | Performed by: FAMILY MEDICINE

## 2019-06-12 PROCEDURE — 86780 TREPONEMA PALLIDUM: CPT | Performed by: FAMILY MEDICINE

## 2019-06-12 PROCEDURE — 99207 ZZC PRENATAL VISIT: CPT | Performed by: FAMILY MEDICINE

## 2019-06-12 ASSESSMENT — PAIN SCALES - GENERAL: PAINLEVEL: NO PAIN (0)

## 2019-06-12 NOTE — PROGRESS NOTES
Patient forgot to come back for her RhoGam injection after she had her blood drawn.  We can do it next week and have her scheduled with the float nurse.  She will not need to have her blood drawn again.     Electronically signed by:  Faisal Moreira M.D.  6/12/2019

## 2019-06-12 NOTE — PROGRESS NOTES
Taking PNVs. No concerns today. Continues to have some anxiety, she attends a woman's group every Wednesday where she sees a therapy and takes tylenol pm to help her sleep. She may take two of these. She does not wish to be on medication at this time. No vaginal bleeding or fluid leaking. No headaches or vision changes. Will use zantac bid.  She will do her TDaP when she comes in for her rhogam next week.     Electronically signed by:  Faisal Moreira M.D.  6/12/2019

## 2019-06-13 LAB — T PALLIDUM AB SER QL: NONREACTIVE

## 2019-06-19 ENCOUNTER — ALLIED HEALTH/NURSE VISIT (OUTPATIENT)
Dept: FAMILY MEDICINE | Facility: CLINIC | Age: 24
End: 2019-06-19
Payer: MEDICAID

## 2019-06-19 ENCOUNTER — HOSPITAL ENCOUNTER (OUTPATIENT)
Facility: CLINIC | Age: 24
Discharge: HOME | End: 2019-06-19
Attending: FAMILY MEDICINE | Admitting: FAMILY MEDICINE
Payer: MEDICAID

## 2019-06-19 VITALS — RESPIRATION RATE: 16 BRPM | SYSTOLIC BLOOD PRESSURE: 118 MMHG | DIASTOLIC BLOOD PRESSURE: 68 MMHG | TEMPERATURE: 98 F

## 2019-06-19 DIAGNOSIS — Z29.13 NEED FOR RHOGAM DUE TO RH NEGATIVE MOTHER: Primary | ICD-10-CM

## 2019-06-19 PROBLEM — Z36.89 ENCOUNTER FOR TRIAGE IN PREGNANT PATIENT: Status: ACTIVE | Noted: 2019-06-19

## 2019-06-19 LAB
ALBUMIN UR-MCNC: NEGATIVE MG/DL
APPEARANCE UR: ABNORMAL
BACTERIA #/AREA URNS HPF: ABNORMAL /HPF
BILIRUB UR QL STRIP: NEGATIVE
COLOR UR AUTO: YELLOW
GLUCOSE UR STRIP-MCNC: NEGATIVE MG/DL
HGB UR QL STRIP: NEGATIVE
KETONES UR STRIP-MCNC: NEGATIVE MG/DL
LEUKOCYTE ESTERASE UR QL STRIP: NEGATIVE
MUCOUS THREADS #/AREA URNS LPF: PRESENT /LPF
NITRATE UR QL: NEGATIVE
PH UR STRIP: 5 PH (ref 5–7)
RBC #/AREA URNS AUTO: 2 /HPF (ref 0–2)
SOURCE: ABNORMAL
SP GR UR STRIP: 1.02 (ref 1–1.03)
SPECIMEN SOURCE: NORMAL
SQUAMOUS #/AREA URNS AUTO: 19 /HPF (ref 0–1)
UROBILINOGEN UR STRIP-MCNC: 2 MG/DL (ref 0–2)
WBC #/AREA URNS AUTO: 2 /HPF (ref 0–5)
WET PREP SPEC: NORMAL

## 2019-06-19 PROCEDURE — 90471 IMMUNIZATION ADMIN: CPT

## 2019-06-19 PROCEDURE — 87210 SMEAR WET MOUNT SALINE/INK: CPT | Performed by: FAMILY MEDICINE

## 2019-06-19 PROCEDURE — 90715 TDAP VACCINE 7 YRS/> IM: CPT

## 2019-06-19 PROCEDURE — G0463 HOSPITAL OUTPT CLINIC VISIT: HCPCS

## 2019-06-19 PROCEDURE — 96372 THER/PROPH/DIAG INJ SC/IM: CPT

## 2019-06-19 PROCEDURE — 81001 URINALYSIS AUTO W/SCOPE: CPT | Performed by: FAMILY MEDICINE

## 2019-06-19 NOTE — PROGRESS NOTES
Prior to injection, verified patient identity using patient's name and date of birth.  Due to injection administration, patient instructed to remain in clinic for 15 minutes  afterwards, and to report any adverse reaction to me immediately.    rhogam    Drug Amount Wasted:  None.  Vial/Syringe: Single dose vial  Expiration Date:  08/31/19    Screening Questionnaire for Adult Immunization    Are you sick today?   No   Do you have allergies to medications, food, a vaccine component or latex?   No   Have you ever had a serious reaction after receiving a vaccination?   No   Do you have a long-term health problem with heart disease, lung disease, asthma, kidney disease, metabolic disease (e.g. diabetes), anemia, or other blood disorder?   No   Do you have cancer, leukemia, HIV/AIDS, or any other immune system problem?   No   In the past 3 months, have you taken medications that affect  your immune system, such as prednisone, other steroids, or anticancer drugs; drugs for the treatment of rheumatoid arthritis, Crohn s disease, or psoriasis; or have you had radiation treatments?   No   Have you had a seizure, or a brain or other nervous system problem?   No   During the past year, have you received a transfusion of blood or blood     products, or been given immune (gamma) globulin or antiviral drug?   No   For women: Are you pregnant or is there a chance you could become        pregnant during the next month?   No   Have you received any vaccinations in the past 4 weeks?   No     Immunization questionnaire answers were all negative.        Per orders of Dr. Moreira, injection of Adacel given by Ayde Ferrer. Patient instructed to remain in clinic for 15 minutes afterwards, and to report any adverse reaction to me immediately.       Screening performed by Ayde Ferrer on 6/19/2019 at 4:56 PM.

## 2019-06-19 NOTE — PROGRESS NOTES
S: Triage Arrival  B: Clare (she goes by Tima) is a 23 y.o.  @ 27w 4d who presents with complaint of blood in urine and pelvic pressure. Pt was sent up from clinic after her prenatal appt.  A: EFM applied. FHT's 145 with moderate variability, accels present, no decels noted on strip. Contractions none. Some irritability noted.  R: Will notify MD to obtain further orders.

## 2019-07-02 ENCOUNTER — HOSPITAL ENCOUNTER (OUTPATIENT)
Facility: CLINIC | Age: 24
Discharge: HOME | End: 2019-07-03
Attending: FAMILY MEDICINE | Admitting: FAMILY MEDICINE
Payer: COMMERCIAL

## 2019-07-02 VITALS — DIASTOLIC BLOOD PRESSURE: 68 MMHG | TEMPERATURE: 98.4 F | RESPIRATION RATE: 16 BRPM | SYSTOLIC BLOOD PRESSURE: 102 MMHG

## 2019-07-02 PROBLEM — R10.9 ABDOMINAL PAIN: Status: ACTIVE | Noted: 2019-07-02

## 2019-07-02 LAB
ALBUMIN UR-MCNC: NEGATIVE MG/DL
AMPHETAMINES UR QL: NOT DETECTED NG/ML
APPEARANCE UR: CLEAR
BARBITURATES UR QL SCN: NOT DETECTED NG/ML
BENZODIAZ UR QL SCN: NOT DETECTED NG/ML
BILIRUB UR QL STRIP: NEGATIVE
BUPRENORPHINE UR QL: NOT DETECTED NG/ML
CANNABINOIDS UR QL: NOT DETECTED NG/ML
COCAINE UR QL SCN: NOT DETECTED NG/ML
COLOR UR AUTO: ABNORMAL
D-METHAMPHET UR QL: NOT DETECTED NG/ML
GLUCOSE UR STRIP-MCNC: NEGATIVE MG/DL
HGB UR QL STRIP: NEGATIVE
KETONES UR STRIP-MCNC: NEGATIVE MG/DL
LEUKOCYTE ESTERASE UR QL STRIP: NEGATIVE
METHADONE UR QL SCN: NOT DETECTED NG/ML
MUCOUS THREADS #/AREA URNS LPF: PRESENT /LPF
NITRATE UR QL: NEGATIVE
OPIATES UR QL SCN: NOT DETECTED NG/ML
OXYCODONE UR QL SCN: NOT DETECTED NG/ML
PCP UR QL SCN: NOT DETECTED NG/ML
PH UR STRIP: 7 PH (ref 5–7)
PROPOXYPH UR QL: NOT DETECTED NG/ML
RBC #/AREA URNS AUTO: <1 /HPF (ref 0–2)
SOURCE: ABNORMAL
SP GR UR STRIP: 1 (ref 1–1.03)
SQUAMOUS #/AREA URNS AUTO: <1 /HPF (ref 0–1)
TRICYCLICS UR QL SCN: NOT DETECTED NG/ML
UROBILINOGEN UR STRIP-MCNC: 0 MG/DL (ref 0–2)
WBC #/AREA URNS AUTO: <1 /HPF (ref 0–5)

## 2019-07-02 PROCEDURE — 81001 URINALYSIS AUTO W/SCOPE: CPT | Performed by: FAMILY MEDICINE

## 2019-07-02 PROCEDURE — 80306 DRUG TEST PRSMV INSTRMNT: CPT | Performed by: FAMILY MEDICINE

## 2019-07-02 PROCEDURE — 87210 SMEAR WET MOUNT SALINE/INK: CPT | Performed by: FAMILY MEDICINE

## 2019-07-03 LAB
SPECIMEN SOURCE: NORMAL
WET PREP SPEC: NORMAL

## 2019-07-03 PROCEDURE — G0463 HOSPITAL OUTPT CLINIC VISIT: HCPCS

## 2019-07-03 NOTE — PROGRESS NOTES
S:  Discharge from triage.   A:  FHT's 140, Moderate variability, Accels present, no decels noted. Contractions not present.  Patient very reassured after being on the monitor.  R:  Written and verbal D/C instruction provided. Pt. Verbalized understanding of D/C instructions and will follow up with Dr. Moreira as previously scheduled.   Verbalizes understanding that she will call or return to [facility] with any further questions or concerns.   Pt. D/C'd via ambulatory with S.O.    Nursing Discharge Checklist  Discharge order entered: Yes  Patient care order entered: Yes  Charges entered: Yes

## 2019-07-03 NOTE — DISCHARGE INSTRUCTIONS
Diet:  -Continue on regular diet as tolerated   -Drink 8-10 glasses of water and/or juice every day    Activity as tolerated     Reason for being seen in L&D       Lower abdominal pain               Call [facility] at [phone] if you have      5 or more contractions in one hour    Leaking of fluid from your vaginal area    Decreased fetal movement (follow kick count instructions)    Bleeding from your vaginal area    Swelling of your face, or increased swelling in your hands or legs    Headaches or vision problems such as blurring or seeing spots or stars    Nausea or vomiting lasting for more than 24 hours    An increase in weight (5lbs./week)    Epigastric pain (sometimes confused with heartburn that does not go away or a very bad stomach ache)    If you have any questions, please follow up with your doctor.

## 2019-07-03 NOTE — PROGRESS NOTES
S: Triage Arrival  B: Clare is a 23 y.o.  @ 29w 3d who presents with complaint of lower abdominal pain  A: EFM applied. FHT's140 with moderate variability, accels present, no decels noted on strip. Contractions not present. U/A sent. Patient very anxious.  R: Will notify MD to obtain further orders.

## 2019-07-10 ENCOUNTER — TELEPHONE (OUTPATIENT)
Dept: FAMILY MEDICINE | Facility: CLINIC | Age: 24
End: 2019-07-10

## 2019-09-27 ENCOUNTER — HEALTH MAINTENANCE LETTER (OUTPATIENT)
Age: 24
End: 2019-09-27

## 2020-01-09 NOTE — TELEPHONE ENCOUNTER
12 hr cc   Reason for call:  Other   Patient called regarding (reason for call): call back  Additional comments: Patient has an OB appt tomorrow, would like a call regarding this appt. from the nurse. Wants to know exactly what is happening at the appt tomorrow. She wants to mentally prepare herself for this.     Phone number to reach patient:  Cell number on file:    Telephone Information:   Mobile [phone]   Mobile [phone]       Best Time:  ASAP     Can we leave a detailed message on this number?  YES

## 2021-01-09 ENCOUNTER — HEALTH MAINTENANCE LETTER (OUTPATIENT)
Age: 26
End: 2021-01-09

## 2021-10-23 ENCOUNTER — HEALTH MAINTENANCE LETTER (OUTPATIENT)
Age: 26
End: 2021-10-23

## 2022-02-12 ENCOUNTER — HEALTH MAINTENANCE LETTER (OUTPATIENT)
Age: 27
End: 2022-02-12

## 2022-10-09 ENCOUNTER — HEALTH MAINTENANCE LETTER (OUTPATIENT)
Age: 27
End: 2022-10-09

## 2023-03-25 ENCOUNTER — HEALTH MAINTENANCE LETTER (OUTPATIENT)
Age: 28
End: 2023-03-25